# Patient Record
Sex: FEMALE | Race: WHITE | NOT HISPANIC OR LATINO | Employment: UNEMPLOYED | URBAN - METROPOLITAN AREA
[De-identification: names, ages, dates, MRNs, and addresses within clinical notes are randomized per-mention and may not be internally consistent; named-entity substitution may affect disease eponyms.]

---

## 2023-04-21 ENCOUNTER — LAB (OUTPATIENT)
Dept: LAB | Facility: HOSPITAL | Age: 33
End: 2023-04-21
Payer: MEDICAID

## 2023-04-21 ENCOUNTER — TRANSCRIBE ORDERS (OUTPATIENT)
Dept: LAB | Facility: HOSPITAL | Age: 33
End: 2023-04-21
Payer: MEDICAID

## 2023-04-21 DIAGNOSIS — Z32.01 PREGNANCY EXAMINATION OR TEST, POSITIVE RESULT: ICD-10-CM

## 2023-04-21 DIAGNOSIS — Z32.01 PREGNANCY EXAMINATION OR TEST, POSITIVE RESULT: Primary | ICD-10-CM

## 2023-04-21 LAB
ABO GROUP BLD: NORMAL
BLD GP AB SCN SERPL QL: NEGATIVE
DEPRECATED RDW RBC AUTO: 45.2 FL (ref 37–54)
ERYTHROCYTE [DISTWIDTH] IN BLOOD BY AUTOMATED COUNT: 13.4 % (ref 12.3–15.4)
HBV SURFACE AG SERPL QL IA: NORMAL
HCT VFR BLD AUTO: 40.2 % (ref 34–46.6)
HGB BLD-MCNC: 13.5 G/DL (ref 12–15.9)
MCH RBC QN AUTO: 30.5 PG (ref 26.6–33)
MCHC RBC AUTO-ENTMCNC: 33.6 G/DL (ref 31.5–35.7)
MCV RBC AUTO: 91 FL (ref 79–97)
PLATELET # BLD AUTO: 298 10*3/MM3 (ref 140–450)
PMV BLD AUTO: 12.6 FL (ref 6–12)
RBC # BLD AUTO: 4.42 10*6/MM3 (ref 3.77–5.28)
RH BLD: POSITIVE
WBC NRBC COR # BLD: 10.87 10*3/MM3 (ref 3.4–10.8)

## 2023-04-21 PROCEDURE — 86762 RUBELLA ANTIBODY: CPT

## 2023-04-21 PROCEDURE — 86803 HEPATITIS C AB TEST: CPT

## 2023-04-21 PROCEDURE — 36415 COLL VENOUS BLD VENIPUNCTURE: CPT

## 2023-04-21 PROCEDURE — 82947 ASSAY GLUCOSE BLOOD QUANT: CPT

## 2023-04-21 PROCEDURE — 86850 RBC ANTIBODY SCREEN: CPT

## 2023-04-21 PROCEDURE — 86900 BLOOD TYPING SEROLOGIC ABO: CPT

## 2023-04-21 PROCEDURE — 86901 BLOOD TYPING SEROLOGIC RH(D): CPT

## 2023-04-21 PROCEDURE — G0432 EIA HIV-1/HIV-2 SCREEN: HCPCS

## 2023-04-21 PROCEDURE — 87340 HEPATITIS B SURFACE AG IA: CPT

## 2023-04-21 PROCEDURE — 85027 COMPLETE CBC AUTOMATED: CPT

## 2023-04-21 PROCEDURE — 86780 TREPONEMA PALLIDUM: CPT

## 2023-04-22 LAB
GLUCOSE SERPL-MCNC: 103 MG/DL (ref 65–99)
HCV AB SER DONR QL: NORMAL
HIV1+2 AB SER QL: NORMAL

## 2023-04-23 LAB — RUBV IGG SERPL IA-ACNC: 0.97 INDEX

## 2023-04-25 LAB — TREPONEMA PALLIDUM IGG+IGM AB [PRESENCE] IN SERUM OR PLASMA BY IMMUNOASSAY: NON REACTIVE

## 2023-08-10 ENCOUNTER — LAB (OUTPATIENT)
Dept: LAB | Facility: HOSPITAL | Age: 33
End: 2023-08-10

## 2023-08-10 ENCOUNTER — TRANSCRIBE ORDERS (OUTPATIENT)
Dept: LAB | Facility: HOSPITAL | Age: 33
End: 2023-08-10

## 2023-08-10 DIAGNOSIS — Z3A.19 19 WEEKS GESTATION OF PREGNANCY: ICD-10-CM

## 2023-08-10 DIAGNOSIS — Z34.82 PRENATAL CARE, SUBSEQUENT PREGNANCY, SECOND TRIMESTER: Primary | ICD-10-CM

## 2023-08-10 DIAGNOSIS — Z34.82 PRENATAL CARE, SUBSEQUENT PREGNANCY, SECOND TRIMESTER: ICD-10-CM

## 2023-08-10 DIAGNOSIS — O24.419 GESTATIONAL DIABETES MELLITUS (GDM), ANTEPARTUM, GESTATIONAL DIABETES METHOD OF CONTROL UNSPECIFIED: ICD-10-CM

## 2023-08-10 LAB
DEPRECATED RDW RBC AUTO: 41.8 FL (ref 37–54)
ERYTHROCYTE [DISTWIDTH] IN BLOOD BY AUTOMATED COUNT: 12.7 % (ref 12.3–15.4)
GLUCOSE 1H P 100 G GLC PO SERPL-MCNC: 121 MG/DL (ref 65–139)
HCT VFR BLD AUTO: 33.5 % (ref 34–46.6)
HGB BLD-MCNC: 11.3 G/DL (ref 12–15.9)
MCH RBC QN AUTO: 30.4 PG (ref 26.6–33)
MCHC RBC AUTO-ENTMCNC: 33.7 G/DL (ref 31.5–35.7)
MCV RBC AUTO: 90.1 FL (ref 79–97)
PLATELET # BLD AUTO: 286 10*3/MM3 (ref 140–450)
PMV BLD AUTO: 11.4 FL (ref 6–12)
RBC # BLD AUTO: 3.72 10*6/MM3 (ref 3.77–5.28)
WBC NRBC COR # BLD: 12.3 10*3/MM3 (ref 3.4–10.8)

## 2023-08-10 PROCEDURE — 82950 GLUCOSE TEST: CPT

## 2023-08-10 PROCEDURE — 36415 COLL VENOUS BLD VENIPUNCTURE: CPT

## 2023-08-10 PROCEDURE — 82962 GLUCOSE BLOOD TEST: CPT | Performed by: OBSTETRICS & GYNECOLOGY

## 2023-08-10 PROCEDURE — 85027 COMPLETE CBC AUTOMATED: CPT

## 2023-10-18 ENCOUNTER — PREP FOR SURGERY (OUTPATIENT)
Dept: OTHER | Facility: HOSPITAL | Age: 33
End: 2023-10-18

## 2023-10-18 DIAGNOSIS — Z98.891 PREVIOUS CESAREAN SECTION: Primary | ICD-10-CM

## 2023-10-18 RX ORDER — LIDOCAINE HYDROCHLORIDE 10 MG/ML
0.5 INJECTION, SOLUTION EPIDURAL; INFILTRATION; INTRACAUDAL; PERINEURAL ONCE AS NEEDED
OUTPATIENT
Start: 2023-10-18

## 2023-10-18 RX ORDER — SODIUM CHLORIDE 0.9 % (FLUSH) 0.9 %
10 SYRINGE (ML) INJECTION EVERY 12 HOURS SCHEDULED
OUTPATIENT
Start: 2023-10-18

## 2023-10-18 RX ORDER — ONDANSETRON 2 MG/ML
4 INJECTION INTRAMUSCULAR; INTRAVENOUS EVERY 6 HOURS PRN
OUTPATIENT
Start: 2023-10-18

## 2023-10-18 RX ORDER — ONDANSETRON 4 MG/1
4 TABLET, FILM COATED ORAL EVERY 6 HOURS PRN
OUTPATIENT
Start: 2023-10-18

## 2023-10-18 RX ORDER — SODIUM CHLORIDE 0.9 % (FLUSH) 0.9 %
10 SYRINGE (ML) INJECTION AS NEEDED
OUTPATIENT
Start: 2023-10-18

## 2023-10-18 RX ORDER — METHYLERGONOVINE MALEATE 0.2 MG/ML
200 INJECTION INTRAVENOUS ONCE AS NEEDED
OUTPATIENT
Start: 2023-10-18

## 2023-10-18 RX ORDER — CEFAZOLIN SODIUM IN 0.9 % NACL 3 G/100 ML
3 INTRAVENOUS SOLUTION, PIGGYBACK (ML) INTRAVENOUS ONCE
OUTPATIENT
Start: 2023-10-18 | End: 2023-10-18

## 2023-10-18 RX ORDER — CARBOPROST TROMETHAMINE 250 UG/ML
250 INJECTION, SOLUTION INTRAMUSCULAR AS NEEDED
OUTPATIENT
Start: 2023-10-18

## 2023-10-18 RX ORDER — OXYTOCIN/0.9 % SODIUM CHLORIDE 30/500 ML
85 PLASTIC BAG, INJECTION (ML) INTRAVENOUS ONCE
OUTPATIENT
Start: 2023-10-18 | End: 2023-10-18

## 2023-10-18 RX ORDER — SODIUM CHLORIDE 9 MG/ML
40 INJECTION, SOLUTION INTRAVENOUS AS NEEDED
OUTPATIENT
Start: 2023-10-18

## 2023-10-18 RX ORDER — ACETAMINOPHEN 500 MG
1000 TABLET ORAL ONCE
OUTPATIENT
Start: 2023-10-18 | End: 2023-10-18

## 2023-10-18 RX ORDER — OXYTOCIN/0.9 % SODIUM CHLORIDE 30/500 ML
650 PLASTIC BAG, INJECTION (ML) INTRAVENOUS ONCE
OUTPATIENT
Start: 2023-10-18 | End: 2023-10-18

## 2023-10-18 RX ORDER — KETOROLAC TROMETHAMINE 30 MG/ML
30 INJECTION, SOLUTION INTRAMUSCULAR; INTRAVENOUS ONCE
OUTPATIENT
Start: 2023-10-18 | End: 2023-10-18

## 2023-10-18 RX ORDER — MISOPROSTOL 200 UG/1
800 TABLET ORAL AS NEEDED
OUTPATIENT
Start: 2023-10-18

## 2023-10-18 RX ORDER — OXYCODONE HYDROCHLORIDE AND ACETAMINOPHEN 5; 325 MG/1; MG/1
1 TABLET ORAL EVERY 4 HOURS PRN
OUTPATIENT
Start: 2023-10-18 | End: 2023-10-28

## 2023-10-18 RX ORDER — CITRIC ACID/SODIUM CITRATE 334-500MG
30 SOLUTION, ORAL ORAL ONCE
OUTPATIENT
Start: 2023-10-18 | End: 2023-10-18

## 2023-10-18 RX ORDER — SODIUM CHLORIDE, SODIUM LACTATE, POTASSIUM CHLORIDE, CALCIUM CHLORIDE 600; 310; 30; 20 MG/100ML; MG/100ML; MG/100ML; MG/100ML
125 INJECTION, SOLUTION INTRAVENOUS CONTINUOUS
OUTPATIENT
Start: 2023-10-18

## 2023-10-24 ENCOUNTER — PRE-ADMISSION TESTING (OUTPATIENT)
Dept: PREADMISSION TESTING | Facility: HOSPITAL | Age: 33
End: 2023-10-24

## 2023-10-24 VITALS — HEIGHT: 63 IN | BODY MASS INDEX: 47.79 KG/M2 | WEIGHT: 269.73 LBS

## 2023-10-24 DIAGNOSIS — Z98.891 PREVIOUS CESAREAN SECTION: ICD-10-CM

## 2023-10-24 LAB
ABO GROUP BLD: NORMAL
BLD GP AB SCN SERPL QL: NEGATIVE
DEPRECATED RDW RBC AUTO: 43.8 FL (ref 37–54)
ERYTHROCYTE [DISTWIDTH] IN BLOOD BY AUTOMATED COUNT: 13.2 % (ref 12.3–15.4)
HCT VFR BLD AUTO: 34.1 % (ref 34–46.6)
HGB BLD-MCNC: 11 G/DL (ref 12–15.9)
MCH RBC QN AUTO: 29.2 PG (ref 26.6–33)
MCHC RBC AUTO-ENTMCNC: 32.3 G/DL (ref 31.5–35.7)
MCV RBC AUTO: 90.5 FL (ref 79–97)
PLATELET # BLD AUTO: 228 10*3/MM3 (ref 140–450)
PMV BLD AUTO: 11.4 FL (ref 6–12)
RBC # BLD AUTO: 3.77 10*6/MM3 (ref 3.77–5.28)
RH BLD: POSITIVE
T&S EXPIRATION DATE: NORMAL
WBC NRBC COR # BLD: 8.24 10*3/MM3 (ref 3.4–10.8)

## 2023-10-24 PROCEDURE — 86900 BLOOD TYPING SEROLOGIC ABO: CPT

## 2023-10-24 PROCEDURE — 85027 COMPLETE CBC AUTOMATED: CPT

## 2023-10-24 PROCEDURE — 86901 BLOOD TYPING SEROLOGIC RH(D): CPT

## 2023-10-24 PROCEDURE — 86850 RBC ANTIBODY SCREEN: CPT

## 2023-10-24 PROCEDURE — 36415 COLL VENOUS BLD VENIPUNCTURE: CPT

## 2023-10-24 RX ORDER — PRENATAL WITH FERROUS FUM AND FOLIC ACID 3080; 920; 120; 400; 22; 1.84; 3; 20; 10; 1; 12; 200; 27; 25; 2 [IU]/1; [IU]/1; MG/1; [IU]/1; MG/1; MG/1; MG/1; MG/1; MG/1; MG/1; UG/1; MG/1; MG/1; MG/1; MG/1
TABLET ORAL DAILY
COMMUNITY

## 2023-10-24 NOTE — PAT
Blood bank bracelet applied to patient during Pre Admission Testing visit.  Patient instructed not to remove from arm until after procedure and they are discharged from the hospital.  Explained to patient that they may shower and get the bracelet wet, but not to immerse under water for longer periods (bathing, swimming, hand dishwashing, etc).  Patient verbalized understanding.    Patient instructed to drink 20 ounces of Gatorade or Gatorlyte (if diabetic) and it needs to be completed 1 hour (for Main OR patients) or 2 hours ([facility]) before given arrival time for procedure (NO RED Gatorade and NO Gatorade Zero).    Patient verbalized understanding.  Instructed patient to take two Tylenol extra strength (total of 1000 mg) the night before surgery.    Patient viewed general PAT education video as instructed in their preoperative information received from their surgeon.  Patient stated the general PAT education video was viewed in its entirety and survey completed.  Copies of PAT general education handouts (Incentive Spirometry, Meds to Beds Program, Patient Belongings, Pre-op skin preparation instructions, Blood Glucose testing, Visitor policy, Surgery FAQ, Code H) distributed to patient if not printed. Education related to the PAT pass and skin preparation for surgery (if applicable) completed in PAT as a reinforcement to PAT education video. Patient instructed to return PAT pass provided today as well as completed skin preparation sheet (if applicable) on the day of procedure.     Additionally if patient had not viewed video yet but intended to view it at home or in our waiting area, then referred them to the handout with QR code/link provided during PAT visit.  Instructed patient to complete survey after viewing the video in its entirety.  Encouraged patient/family to read PAT general education handouts thoroughly and notify PAT staff with any questions or concerns. Patient  verbalized understanding of all information and priority content.    Patient to apply Chlorhexadine wipes  to surgical area (as instructed) the night before procedure and the AM of procedure. Wipes provided.

## 2023-10-24 NOTE — DISCHARGE INSTRUCTIONS
What to know before your arrive:    -Do not eat, drink or chew gum beginning 8 hours before your scheduled arrival time to the hospital.  Except please drink 20 ounces of Gatorade and complete two hours before your given arrival time to the hospital.  If you drink too close to surgery time, your sugery may  be delayed or cancelled.  Please complete as instructed.     -Do not shave any part of your body including abdomen or pelvic are for two days before your procedure.  -If you are taking a scheduled medication (insulin, blood pressure medicine,antibiotics) please consult with your physician whether to take on the day of surgery.  -Remove all jewelry including rings, wedding bands, and piercing before coming to the hospital.  -Leave important valuables at home.  -Do not wear dark fingernail polish.  -Please take two Tylenol 500 mg tablets the evening before surgery.  -Bring the following with you to the hospital:    -Picture ID and insurance, Medicare or Medicaid cards    -Co-pay/deductible required by insurance (Cash, Check, Credit Card)    -Copy of living will or power  document (if applicable)    -CPAP mask and tubing, not machine (if applicable)    -Skin prep instructions sheet    What to know the day of procedure:    -Park in the [facility], take elevator for first floor, exit to the right and  proceed through the doors to outside, follow the covered sidewalk to the entrance of the [place], [facility] the [facility] and [facility], enter the [place] to your right BEFORE entering the 17[address] lobby.  Take the elevators to the 3rd floor ([address] [place]).  -Leave unnecessary items in your vehicle, including your suitcase.  Your support  person or a family member can get it for you after your procedure.   -Check in at the reception desk in the lobby of the 3rd floor ([address] [place]).   -One person may accompany you to the pre-op/recovery area.  Please have  other family members wait in the  waiting room.   -An anesthesiologist will meet with your prior to your procedure.   -After anesthesia has been initiated, one person may accompany you in the  operating room.   -No video cameras are permitted in the operating room; only still cameras,  Please.      What to expect while you are in recovery:     -One person may stay with you while you are in recovery.   -If the baby is stable, he/she may visit to initiate breastfeeding & [facility].      CHLORHEXIDINE GLUCONATE WIPES AND INSTRUCTIONS GIVEN TO PATIENT

## 2023-10-27 ENCOUNTER — ANESTHESIA (OUTPATIENT)
Dept: LABOR AND DELIVERY | Facility: HOSPITAL | Age: 33
End: 2023-10-27

## 2023-10-27 ENCOUNTER — ANESTHESIA EVENT (OUTPATIENT)
Dept: LABOR AND DELIVERY | Facility: HOSPITAL | Age: 33
End: 2023-10-27

## 2023-10-27 ENCOUNTER — HOSPITAL ENCOUNTER (INPATIENT)
Facility: HOSPITAL | Age: 33
LOS: 2 days | Discharge: HOME | End: 2023-10-29
Attending: OBSTETRICS & GYNECOLOGY | Admitting: OBSTETRICS & GYNECOLOGY

## 2023-10-27 DIAGNOSIS — Z98.891 S/P REPEAT LOW TRANSVERSE C-SECTION: Primary | ICD-10-CM

## 2023-10-27 DIAGNOSIS — Z98.891 PREVIOUS CESAREAN SECTION: ICD-10-CM

## 2023-10-27 LAB
ABO GROUP BLD: NORMAL
AMPHET+METHAMPHET UR QL: NEGATIVE
AMPHETAMINES UR QL: NEGATIVE
BARBITURATES UR QL SCN: NEGATIVE
BENZODIAZ UR QL SCN: NEGATIVE
BLD GP AB SCN SERPL QL: NEGATIVE
BUPRENORPHINE SERPL-MCNC: NEGATIVE NG/ML
CANNABINOIDS SERPL QL: NEGATIVE
COCAINE UR QL: NEGATIVE
FENTANYL UR-MCNC: NEGATIVE NG/ML
METHADONE UR QL SCN: NEGATIVE
OPIATES UR QL: NEGATIVE
OXYCODONE UR QL SCN: NEGATIVE
PCP UR QL SCN: NEGATIVE
PROPOXYPH UR QL: NEGATIVE
RH BLD: POSITIVE
T&S EXPIRATION DATE: NORMAL
TRICYCLICS UR QL SCN: NEGATIVE

## 2023-10-27 PROCEDURE — 25810000003 LACTATED RINGERS PER 1000 ML: Performed by: OBSTETRICS & GYNECOLOGY

## 2023-10-27 PROCEDURE — 80307 DRUG TEST PRSMV CHEM ANLYZR: CPT | Performed by: OBSTETRICS & GYNECOLOGY

## 2023-10-27 PROCEDURE — 86900 BLOOD TYPING SEROLOGIC ABO: CPT | Performed by: OBSTETRICS & GYNECOLOGY

## 2023-10-27 PROCEDURE — 25010000002 ONDANSETRON PER 1 MG: Performed by: NURSE ANESTHETIST, CERTIFIED REGISTERED

## 2023-10-27 PROCEDURE — 25010000002 BUPIVACAINE IN DEXTROSE 0.75-8.25 % SOLUTION: Performed by: NURSE ANESTHETIST, CERTIFIED REGISTERED

## 2023-10-27 PROCEDURE — 25010000002 MIDAZOLAM PER 1 MG: Performed by: NURSE ANESTHETIST, CERTIFIED REGISTERED

## 2023-10-27 PROCEDURE — 25010000002 FENTANYL CITRATE (PF) 50 MCG/ML SOLUTION: Performed by: NURSE ANESTHETIST, CERTIFIED REGISTERED

## 2023-10-27 PROCEDURE — 25810000003 LACTATED RINGERS SOLUTION: Performed by: OBSTETRICS & GYNECOLOGY

## 2023-10-27 PROCEDURE — 25010000002 MORPHINE PER 10 MG: Performed by: NURSE ANESTHETIST, CERTIFIED REGISTERED

## 2023-10-27 PROCEDURE — 86850 RBC ANTIBODY SCREEN: CPT | Performed by: OBSTETRICS & GYNECOLOGY

## 2023-10-27 PROCEDURE — 86901 BLOOD TYPING SEROLOGIC RH(D): CPT | Performed by: OBSTETRICS & GYNECOLOGY

## 2023-10-27 PROCEDURE — 25010000002 KETOROLAC TROMETHAMINE PER 15 MG: Performed by: OBSTETRICS & GYNECOLOGY

## 2023-10-27 PROCEDURE — 59025 FETAL NON-STRESS TEST: CPT

## 2023-10-27 PROCEDURE — 25010000002 CEFAZOLIN PER 500 MG: Performed by: OBSTETRICS & GYNECOLOGY

## 2023-10-27 RX ORDER — OXYTOCIN/0.9 % SODIUM CHLORIDE 30/500 ML
650 PLASTIC BAG, INJECTION (ML) INTRAVENOUS ONCE
Status: DISCONTINUED | OUTPATIENT
Start: 2023-10-27 | End: 2023-10-27 | Stop reason: HOSPADM

## 2023-10-27 RX ORDER — ONDANSETRON 2 MG/ML
4 INJECTION INTRAMUSCULAR; INTRAVENOUS EVERY 6 HOURS PRN
Status: DISCONTINUED | OUTPATIENT
Start: 2023-10-27 | End: 2023-10-27 | Stop reason: HOSPADM

## 2023-10-27 RX ORDER — FAMOTIDINE 10 MG/ML
INJECTION, SOLUTION INTRAVENOUS AS NEEDED
Status: DISCONTINUED | OUTPATIENT
Start: 2023-10-27 | End: 2023-10-27 | Stop reason: SURG

## 2023-10-27 RX ORDER — GLYCOPYRROLATE 0.2 MG/ML
INJECTION INTRAMUSCULAR; INTRAVENOUS AS NEEDED
Status: DISCONTINUED | OUTPATIENT
Start: 2023-10-27 | End: 2023-10-27 | Stop reason: SURG

## 2023-10-27 RX ORDER — CEFAZOLIN SODIUM IN 0.9 % NACL 3 G/100 ML
3 INTRAVENOUS SOLUTION, PIGGYBACK (ML) INTRAVENOUS ONCE
Status: COMPLETED | OUTPATIENT
Start: 2023-10-27 | End: 2023-10-27

## 2023-10-27 RX ORDER — POLYETHYLENE GLYCOL 3350 17 G/17G
17 POWDER, FOR SOLUTION ORAL DAILY
Status: DISCONTINUED | OUTPATIENT
Start: 2023-10-27 | End: 2023-10-29 | Stop reason: HOSPADM

## 2023-10-27 RX ORDER — ACETAMINOPHEN 500 MG
1000 TABLET ORAL ONCE
Status: COMPLETED | OUTPATIENT
Start: 2023-10-27 | End: 2023-10-27

## 2023-10-27 RX ORDER — LIDOCAINE HYDROCHLORIDE 10 MG/ML
0.5 INJECTION, SOLUTION EPIDURAL; INFILTRATION; INTRACAUDAL; PERINEURAL ONCE AS NEEDED
Status: DISCONTINUED | OUTPATIENT
Start: 2023-10-27 | End: 2023-10-27 | Stop reason: HOSPADM

## 2023-10-27 RX ORDER — FENTANYL CITRATE 50 UG/ML
INJECTION, SOLUTION INTRAMUSCULAR; INTRAVENOUS AS NEEDED
Status: DISCONTINUED | OUTPATIENT
Start: 2023-10-27 | End: 2023-10-27 | Stop reason: SURG

## 2023-10-27 RX ORDER — OXYCODONE HYDROCHLORIDE 10 MG/1
10 TABLET ORAL EVERY 4 HOURS PRN
Status: DISCONTINUED | OUTPATIENT
Start: 2023-10-27 | End: 2023-10-29 | Stop reason: HOSPADM

## 2023-10-27 RX ORDER — CARBOPROST TROMETHAMINE 250 UG/ML
250 INJECTION, SOLUTION INTRAMUSCULAR AS NEEDED
Status: DISCONTINUED | OUTPATIENT
Start: 2023-10-27 | End: 2023-10-27 | Stop reason: HOSPADM

## 2023-10-27 RX ORDER — MORPHINE SULFATE 0.5 MG/ML
INJECTION, SOLUTION EPIDURAL; INTRATHECAL; INTRAVENOUS AS NEEDED
Status: DISCONTINUED | OUTPATIENT
Start: 2023-10-27 | End: 2023-10-27 | Stop reason: SURG

## 2023-10-27 RX ORDER — MISOPROSTOL 200 UG/1
600 TABLET ORAL AS NEEDED
Status: DISCONTINUED | OUTPATIENT
Start: 2023-10-27 | End: 2023-10-29 | Stop reason: HOSPADM

## 2023-10-27 RX ORDER — NALOXONE HCL 0.4 MG/ML
0.4 VIAL (ML) INJECTION
Status: ACTIVE | OUTPATIENT
Start: 2023-10-27 | End: 2023-10-28

## 2023-10-27 RX ORDER — DIPHENHYDRAMINE HCL 25 MG
25 CAPSULE ORAL EVERY 4 HOURS PRN
Status: DISCONTINUED | OUTPATIENT
Start: 2023-10-27 | End: 2023-10-29 | Stop reason: HOSPADM

## 2023-10-27 RX ORDER — EPHEDRINE SULFATE 5 MG/ML
INJECTION INTRAVENOUS AS NEEDED
Status: DISCONTINUED | OUTPATIENT
Start: 2023-10-27 | End: 2023-10-27 | Stop reason: SURG

## 2023-10-27 RX ORDER — OXYTOCIN/0.9 % SODIUM CHLORIDE 30/500 ML
PLASTIC BAG, INJECTION (ML) INTRAVENOUS AS NEEDED
Status: DISCONTINUED | OUTPATIENT
Start: 2023-10-27 | End: 2023-10-27 | Stop reason: SURG

## 2023-10-27 RX ORDER — OXYTOCIN/0.9 % SODIUM CHLORIDE 30/500 ML
125 PLASTIC BAG, INJECTION (ML) INTRAVENOUS CONTINUOUS PRN
Status: DISCONTINUED | OUTPATIENT
Start: 2023-10-27 | End: 2023-10-29 | Stop reason: HOSPADM

## 2023-10-27 RX ORDER — MISOPROSTOL 200 UG/1
800 TABLET ORAL AS NEEDED
Status: DISCONTINUED | OUTPATIENT
Start: 2023-10-27 | End: 2023-10-27 | Stop reason: HOSPADM

## 2023-10-27 RX ORDER — ACETAMINOPHEN 500 MG
1000 TABLET ORAL EVERY 6 HOURS
Status: COMPLETED | OUTPATIENT
Start: 2023-10-27 | End: 2023-10-28

## 2023-10-27 RX ORDER — FENTANYL CITRATE 50 UG/ML
50 INJECTION, SOLUTION INTRAMUSCULAR; INTRAVENOUS
Status: DISCONTINUED | OUTPATIENT
Start: 2023-10-27 | End: 2023-10-27 | Stop reason: HOSPADM

## 2023-10-27 RX ORDER — KETOROLAC TROMETHAMINE 30 MG/ML
30 INJECTION, SOLUTION INTRAMUSCULAR; INTRAVENOUS ONCE
Status: COMPLETED | OUTPATIENT
Start: 2023-10-27 | End: 2023-10-27

## 2023-10-27 RX ORDER — ONDANSETRON 2 MG/ML
4 INJECTION INTRAMUSCULAR; INTRAVENOUS ONCE AS NEEDED
Status: DISCONTINUED | OUTPATIENT
Start: 2023-10-27 | End: 2023-10-27 | Stop reason: HOSPADM

## 2023-10-27 RX ORDER — SODIUM CHLORIDE 9 MG/ML
40 INJECTION, SOLUTION INTRAVENOUS AS NEEDED
Status: DISCONTINUED | OUTPATIENT
Start: 2023-10-27 | End: 2023-10-27 | Stop reason: HOSPADM

## 2023-10-27 RX ORDER — DOCUSATE SODIUM 100 MG/1
100 CAPSULE, LIQUID FILLED ORAL 2 TIMES DAILY PRN
Status: DISCONTINUED | OUTPATIENT
Start: 2023-10-27 | End: 2023-10-29 | Stop reason: HOSPADM

## 2023-10-27 RX ORDER — DIPHENHYDRAMINE HYDROCHLORIDE 50 MG/ML
25 INJECTION INTRAMUSCULAR; INTRAVENOUS EVERY 4 HOURS PRN
Status: DISCONTINUED | OUTPATIENT
Start: 2023-10-27 | End: 2023-10-29 | Stop reason: HOSPADM

## 2023-10-27 RX ORDER — KETOROLAC TROMETHAMINE 30 MG/ML
30 INJECTION, SOLUTION INTRAMUSCULAR; INTRAVENOUS ONCE
Status: DISCONTINUED | OUTPATIENT
Start: 2023-10-27 | End: 2023-10-27 | Stop reason: SDUPTHER

## 2023-10-27 RX ORDER — SODIUM CHLORIDE, SODIUM LACTATE, POTASSIUM CHLORIDE, CALCIUM CHLORIDE 600; 310; 30; 20 MG/100ML; MG/100ML; MG/100ML; MG/100ML
125 INJECTION, SOLUTION INTRAVENOUS CONTINUOUS
Status: DISCONTINUED | OUTPATIENT
Start: 2023-10-27 | End: 2023-10-28

## 2023-10-27 RX ORDER — ONDANSETRON 4 MG/1
4 TABLET, FILM COATED ORAL EVERY 6 HOURS PRN
Status: DISCONTINUED | OUTPATIENT
Start: 2023-10-27 | End: 2023-10-29 | Stop reason: HOSPADM

## 2023-10-27 RX ORDER — ONDANSETRON 2 MG/ML
4 INJECTION INTRAMUSCULAR; INTRAVENOUS EVERY 6 HOURS PRN
Status: DISCONTINUED | OUTPATIENT
Start: 2023-10-27 | End: 2023-10-29 | Stop reason: HOSPADM

## 2023-10-27 RX ORDER — METHYLERGONOVINE MALEATE 0.2 MG/ML
200 INJECTION INTRAVENOUS AS NEEDED
Status: DISCONTINUED | OUTPATIENT
Start: 2023-10-27 | End: 2023-10-29 | Stop reason: HOSPADM

## 2023-10-27 RX ORDER — ACETAMINOPHEN 325 MG/1
650 TABLET ORAL EVERY 6 HOURS
Status: DISCONTINUED | OUTPATIENT
Start: 2023-10-28 | End: 2023-10-29 | Stop reason: HOSPADM

## 2023-10-27 RX ORDER — OXYTOCIN/0.9 % SODIUM CHLORIDE 30/500 ML
85 PLASTIC BAG, INJECTION (ML) INTRAVENOUS ONCE
Status: DISCONTINUED | OUTPATIENT
Start: 2023-10-27 | End: 2023-10-27 | Stop reason: HOSPADM

## 2023-10-27 RX ORDER — ALUMINA, MAGNESIA, AND SIMETHICONE 2400; 2400; 240 MG/30ML; MG/30ML; MG/30ML
15 SUSPENSION ORAL EVERY 4 HOURS PRN
Status: DISCONTINUED | OUTPATIENT
Start: 2023-10-27 | End: 2023-10-29 | Stop reason: HOSPADM

## 2023-10-27 RX ORDER — SODIUM CHLORIDE 0.9 % (FLUSH) 0.9 %
10 SYRINGE (ML) INJECTION AS NEEDED
Status: DISCONTINUED | OUTPATIENT
Start: 2023-10-27 | End: 2023-10-27 | Stop reason: HOSPADM

## 2023-10-27 RX ORDER — KETOROLAC TROMETHAMINE 15 MG/ML
15 INJECTION, SOLUTION INTRAMUSCULAR; INTRAVENOUS EVERY 6 HOURS
Status: COMPLETED | OUTPATIENT
Start: 2023-10-27 | End: 2023-10-28

## 2023-10-27 RX ORDER — IBUPROFEN 600 MG/1
600 TABLET ORAL EVERY 6 HOURS
Status: DISCONTINUED | OUTPATIENT
Start: 2023-10-28 | End: 2023-10-29 | Stop reason: HOSPADM

## 2023-10-27 RX ORDER — METHYLERGONOVINE MALEATE 0.2 MG/ML
200 INJECTION INTRAVENOUS ONCE AS NEEDED
Status: DISCONTINUED | OUTPATIENT
Start: 2023-10-27 | End: 2023-10-27 | Stop reason: HOSPADM

## 2023-10-27 RX ORDER — ENOXAPARIN SODIUM 100 MG/ML
40 INJECTION SUBCUTANEOUS EVERY 12 HOURS
Status: DISCONTINUED | OUTPATIENT
Start: 2023-10-28 | End: 2023-10-29 | Stop reason: HOSPADM

## 2023-10-27 RX ORDER — SODIUM CHLORIDE 0.9 % (FLUSH) 0.9 %
10 SYRINGE (ML) INJECTION EVERY 12 HOURS SCHEDULED
Status: DISCONTINUED | OUTPATIENT
Start: 2023-10-27 | End: 2023-10-27 | Stop reason: HOSPADM

## 2023-10-27 RX ORDER — PRENATAL VIT/IRON FUM/FOLIC AC 27MG-0.8MG
1 TABLET ORAL DAILY
Status: DISCONTINUED | OUTPATIENT
Start: 2023-10-27 | End: 2023-10-29 | Stop reason: HOSPADM

## 2023-10-27 RX ORDER — SIMETHICONE 80 MG
80 TABLET,CHEWABLE ORAL 4 TIMES DAILY PRN
Status: DISCONTINUED | OUTPATIENT
Start: 2023-10-27 | End: 2023-10-29 | Stop reason: HOSPADM

## 2023-10-27 RX ORDER — CARBOPROST TROMETHAMINE 250 UG/ML
250 INJECTION, SOLUTION INTRAMUSCULAR AS NEEDED
Status: DISCONTINUED | OUTPATIENT
Start: 2023-10-27 | End: 2023-10-29 | Stop reason: HOSPADM

## 2023-10-27 RX ORDER — OXYCODONE HYDROCHLORIDE AND ACETAMINOPHEN 5; 325 MG/1; MG/1
1 TABLET ORAL EVERY 4 HOURS PRN
Status: DISCONTINUED | OUTPATIENT
Start: 2023-10-27 | End: 2023-10-27 | Stop reason: HOSPADM

## 2023-10-27 RX ORDER — MIDAZOLAM HYDROCHLORIDE 1 MG/ML
INJECTION INTRAMUSCULAR; INTRAVENOUS AS NEEDED
Status: DISCONTINUED | OUTPATIENT
Start: 2023-10-27 | End: 2023-10-27 | Stop reason: SURG

## 2023-10-27 RX ORDER — ONDANSETRON 4 MG/1
4 TABLET, FILM COATED ORAL EVERY 6 HOURS PRN
Status: DISCONTINUED | OUTPATIENT
Start: 2023-10-27 | End: 2023-10-27 | Stop reason: HOSPADM

## 2023-10-27 RX ORDER — ONDANSETRON 2 MG/ML
INJECTION INTRAMUSCULAR; INTRAVENOUS AS NEEDED
Status: DISCONTINUED | OUTPATIENT
Start: 2023-10-27 | End: 2023-10-27 | Stop reason: SURG

## 2023-10-27 RX ORDER — CALCIUM CARBONATE 500 MG/1
1 TABLET, CHEWABLE ORAL EVERY 4 HOURS PRN
Status: DISCONTINUED | OUTPATIENT
Start: 2023-10-27 | End: 2023-10-29 | Stop reason: HOSPADM

## 2023-10-27 RX ORDER — BUPIVACAINE HYDROCHLORIDE 7.5 MG/ML
INJECTION, SOLUTION INTRASPINAL AS NEEDED
Status: DISCONTINUED | OUTPATIENT
Start: 2023-10-27 | End: 2023-10-27 | Stop reason: SURG

## 2023-10-27 RX ORDER — HYDROCORTISONE 25 MG/G
1 CREAM TOPICAL AS NEEDED
Status: DISCONTINUED | OUTPATIENT
Start: 2023-10-27 | End: 2023-10-29 | Stop reason: HOSPADM

## 2023-10-27 RX ORDER — OXYCODONE HYDROCHLORIDE 5 MG/1
5 TABLET ORAL EVERY 4 HOURS PRN
Status: DISCONTINUED | OUTPATIENT
Start: 2023-10-27 | End: 2023-10-29 | Stop reason: HOSPADM

## 2023-10-27 RX ORDER — CITRIC ACID/SODIUM CITRATE 334-500MG
30 SOLUTION, ORAL ORAL ONCE
Status: COMPLETED | OUTPATIENT
Start: 2023-10-27 | End: 2023-10-27

## 2023-10-27 RX ADMIN — MIDAZOLAM 1 MG: 1 INJECTION INTRAMUSCULAR; INTRAVENOUS at 12:48

## 2023-10-27 RX ADMIN — ACETAMINOPHEN 1000 MG: 500 TABLET ORAL at 17:20

## 2023-10-27 RX ADMIN — MORPHINE SULFATE 0.1 MG: 0.5 INJECTION, SOLUTION EPIDURAL; INTRATHECAL; INTRAVENOUS at 12:55

## 2023-10-27 RX ADMIN — SODIUM CITRATE AND CITRIC ACID MONOHYDRATE 30 ML: 500; 334 SOLUTION ORAL at 12:41

## 2023-10-27 RX ADMIN — KETOROLAC TROMETHAMINE 30 MG: 30 INJECTION INTRAMUSCULAR; INTRAVENOUS at 15:17

## 2023-10-27 RX ADMIN — SODIUM CHLORIDE, POTASSIUM CHLORIDE, SODIUM LACTATE AND CALCIUM CHLORIDE 125 ML/HR: 600; 310; 30; 20 INJECTION, SOLUTION INTRAVENOUS at 11:15

## 2023-10-27 RX ADMIN — EPHEDRINE SULFATE 10 MG: 5 INJECTION INTRAVENOUS at 13:03

## 2023-10-27 RX ADMIN — Medication 500 ML: at 13:44

## 2023-10-27 RX ADMIN — SODIUM CHLORIDE, POTASSIUM CHLORIDE, SODIUM LACTATE AND CALCIUM CHLORIDE 125 ML/HR: 600; 310; 30; 20 INJECTION, SOLUTION INTRAVENOUS at 15:58

## 2023-10-27 RX ADMIN — ONDANSETRON 4 MG: 2 INJECTION INTRAMUSCULAR; INTRAVENOUS at 13:00

## 2023-10-27 RX ADMIN — SODIUM CHLORIDE, POTASSIUM CHLORIDE, SODIUM LACTATE AND CALCIUM CHLORIDE: 600; 310; 30; 20 INJECTION, SOLUTION INTRAVENOUS at 13:11

## 2023-10-27 RX ADMIN — FENTANYL CITRATE 20 MCG: 50 INJECTION, SOLUTION INTRAMUSCULAR; INTRAVENOUS at 12:55

## 2023-10-27 RX ADMIN — CEFAZOLIN 3 G: 10 INJECTION, POWDER, FOR SOLUTION INTRAVENOUS at 12:41

## 2023-10-27 RX ADMIN — EPHEDRINE SULFATE 10 MG: 5 INJECTION INTRAVENOUS at 13:15

## 2023-10-27 RX ADMIN — FAMOTIDINE 20 MG: 10 INJECTION INTRAVENOUS at 13:00

## 2023-10-27 RX ADMIN — BUPIVACAINE HYDROCHLORIDE IN DEXTROSE 1.4 ML: 7.5 INJECTION, SOLUTION SUBARACHNOID at 12:55

## 2023-10-27 RX ADMIN — ACETAMINOPHEN 1000 MG: 500 TABLET ORAL at 11:54

## 2023-10-27 RX ADMIN — KETOROLAC TROMETHAMINE 15 MG: 15 INJECTION, SOLUTION INTRAMUSCULAR; INTRAVENOUS at 21:24

## 2023-10-27 RX ADMIN — SODIUM CHLORIDE, POTASSIUM CHLORIDE, SODIUM LACTATE AND CALCIUM CHLORIDE 1000 ML: 600; 310; 30; 20 INJECTION, SOLUTION INTRAVENOUS at 12:09

## 2023-10-27 RX ADMIN — GLYCOPYRROLATE 0.2 MG: 0.4 INJECTION INTRAMUSCULAR; INTRAVENOUS at 13:06

## 2023-10-27 NOTE — ANESTHESIA PREPROCEDURE EVALUATION
Anesthesia Evaluation     Patient summary reviewed and Nursing notes reviewed   history of anesthetic complications:  PONV  NPO Solid Status: > 8 hours  NPO Liquid Status: > 8 hours           Airway   Dental      Pulmonary - negative pulmonary ROS   Cardiovascular - negative cardio ROS        Neuro/Psych  (+) seizures (as a child-none x 12 years) well controlled, psychiatric history Anxiety and Depression  GI/Hepatic/Renal/Endo    (+) obesity    Musculoskeletal (-) negative ROS    Abdominal    Substance History - negative use     OB/GYN    (+) Pregnant    Comment:   Previous C/S  H/O GHTN -1st pregnancy      Other                    Anesthesia Plan    ASA 2     spinal and ITN       Anesthetic plan, risks, benefits, and alternatives have been provided, discussed and informed consent has been obtained with: patient.    CODE STATUS:    Level Of Support Discussed With: Patient  Code Status (Patient has no pulse and is not breathing): CPR (Attempt to Resuscitate)  Medical Interventions (Patient has pulse or is breathing): Full Support

## 2023-10-27 NOTE — L&D DELIVERY NOTE
Repeat Low Transverse  Section Procedure Note    Umu Queen    10/27/2023     Indications: 1. IUP at 39w3d   2. Previous  x 1   3. Class 3 obesity (BMI 47)    Pre-operative Diagnosis: 39w3d    Post-operative Diagnosis: same    Findings: VMI in vertex presentation; normal appearing maternal anatomy    Birth Information  YOB: 2021   Time of birth: 1:16 PM   Delivering clinician: Emma Barrera   Sex: male   Delivery type: , Low Transverse   Breech type (if applicable):     Observed anomalies/comments:         Estimated Blood Loss:  less than 800cc (QBL not calculated at time of note)           Drains: Estevez, 50cc clear urine                 Specimens: placenta (not sent to pathology)               Complications:  None; patient tolerated the procedure well.           Disposition: PACU - hemodynamically stable.           Condition: stable    Surgeon: Emma Barrera MD     Assistants: miguelangel Lizarraga    Anesthesia: Spinal anesthesia    ASA Class: 2    Procedure Details   The patient was seen in the Holding Room. The risks, benefits, complications, treatment options, and expected outcomes were discussed with the patient.  The patient concurred with the proposed plan, giving informed consent.  The site of surgery was properly noted. The patient was taken to the Operating Room, identified as Umu Queen and the procedure verified as  Delivery. A Time Out was held and the above information confirmed.    The patient was taken to the operating room where spinal anesthesia was placed and was found to be adequate. She was prepped and draped in the usual sterile fashion in the dorsal supine position with a leftward tilt. A Pfannenstiel skin incision was made with the scalpel and carried through to the underlying layer of fascia using the bovie. The fascia was then nicked in the midline, and the fascial incision was extended laterally. The superior  aspect of the fascial incision was then grasped with Kochers, elevated, and the underlying rectus muscles were dissected off bluntly and sharply. In a similar fashion, the inferior aspect of the fascial incision was grasped with the Kochers, elevated, and the underlying rectus muscles were dissected off bluntly and sharply. The rectus muscles were then  in the midline and the peritoneum was identified. The peritoneum was entered bluntly, and this incision was extended superiorly and inferiorly with good visualization of underlying structures.  The bladder blade was then inserted. A bladder flap was not turned down. The lower uterine segment was identified and a low transverse uterine incision was made using the scalpel. Delivered from vertex presentation was a  Measurements  Weight (oz): 151.39    Length (in): 20    Head circumference (in):      Chest circumference (in):    with apgars scores of         APGARS  One minute Five minutes Ten minutes Fifteen minutes Twenty minutes   Skin color: 0   0   1          Heart rate: 2   2   2          Grimace: 2   2   2           Muscle tone: 2   2   2           Breathin   2   2           Totals: 8   8   9           . The nose and mouth were suctioned, the cord was clamped and cut, and the infant was handed off to the awaiting Delivery Room Team. Cord blood was then obtained. The placenta was removed intact and appeared normal. The uterus was then exteriorized from the abdominal cavity and cleared of all clots and debris. The uterine outline, tubes and ovaries appeared normal. The hysterotomy was then closed using 1 Monocryl in a running, locked fashion. There was a rent in the lower uterine segment due to extremely thin myometrium. This was reapproximated with two figure of eight sutures of 1 monocryl. Hemostasis was observed.  The uterus was placed back inside the abdominal cavity, and the gutters were cleared of clots and debris. The hysterotomy was again  reexamined and excellent hemostasis continued to be noted. The fascia was then reapproximated with running sutures of 0 PDS. The incision was then irrigated, and the subcutaneous tissue was reapproximated with 3-0 plain. The skin was reapproximated with 4-0 Monocryl and Skin glue.    Instrument, sponge, and needle counts were correct prior the abdominal closure and at the conclusion of the case.         Emma Barrera MD  14:00 EDT  10/27/2023

## 2023-10-27 NOTE — LACTATION NOTE
"   10/27/23 1800   Maternal Information   Date of Referral 10/27/23   Person Making Referral lactation consultant  (newly postpartum)   Maternal Reason for Referral breastfeeding unsuccessful in past  (#1 pt stated \"milk dried up due to antihistamine use\")   Infant Reason for Referral  infant   Maternal Assessment   Breast Shape Bilateral:;wide;tubular   Breast Density Bilateral:;soft   Nipples Bilateral:;short  (small nipple shield provided)   Left Nipple Symptoms intact;nontender   Right Nipple Symptoms intact;nontender   Maternal Infant Feeding   Maternal Emotional State receptive;relaxed   Infant Positioning clutch/football  (right)   Signs of Milk Transfer none noted  (infant sleepy at the breast)   Pain with Feeding no   Latch Assistance full assistance needed   Support Person Involvement verbally supports mother   Milk Expression/Equipment   Breast Pump Type manual pump  (manual pump provided.  [employer] self pay information provided for a pump in the hospital)   Breast Pump Flange Type hard   Breast Pumping   Breast Pumping Interventions other (see comments)  (can pump for short or missed feeds)     Courtesy visit with breastfeeding mother that unsuccessfully breastfeed her 1st child due to milk supply.  Went over basic breastfeeding information, provided written materials.  Showed patient the patient handbook starting on page 35 regarding breastfeeding, including feeding cues.  Encouraged patient to feed infant every 3 hours or more often with feeding cues.  Provided a hand pump.  Encouraged PRN lactation as needed.  All questions/concerns answered at this time.  "

## 2023-10-27 NOTE — H&P
"[facility]  Obstetric History and Physical    Chief Complaint   Patient presents with    Scheduled        Subjective     Patient is a 33 y.o. female  currently at 39w3d, who presents for scheduled repeat  section.    Her prenatal care is complicated by class 3 obesity (BMI 47) and hx of depression (not currently on meds).  Her previous obstetric/gynecological history is noted and is remarkable for previous  x 1 with undiagnosed IUGR.    The following portions of the patients history were reviewed and updated as appropriate: current medications, allergies, past medical history, past surgical history, past family history, past social history, and problem list .       Prenatal Information:   Maternal Prenatal Labs  Blood Type No results found for: \"ABO\"   Rh Status No results found for: \"RH\"   Antibody Screen No results found for: \"ABSCRN\"   Gonnorhea No results found for: \"GCCX\"   Chlamydia No results found for: \"CLAMYDCU\"   RPR No results found for: \"RPR\"   Syphilis Antibody No results found for: \"SYPHILIS\"   Rubella No results found for: \"RUBELLAIGGIN\"   Hepatitis B Surface Antigen No results found for: \"HEPBSAG\"   HIV-1 Antibody No results found for: \"LABHIV1\"   Hepatitis C Antibody No results found for: \"HEPCAB\"   Rapid Urin Drug Screen No results found for: \"AMPMETHU\", \"BARBITSCNUR\", \"LABBENZSCN\", \"LABMETHSCN\", \"LABOPIASCN\", \"THCURSCR\", \"COCAINEUR\", \"AMPHETSCREEN\", \"PROPOXSCN\", \"BUPRENORSCNU\", \"METAMPSCNUR\", \"OXYCODONESCN\", \"TRICYCLICSCN\"   Group B Strep Culture No results found for: \"GBSANTIGEN\"           External Prenatal Results       Pregnancy Outside Results - Transcribed From Office Records - See Scanned Records For Details       Test Value Date Time    ABO  A  10/24/23 1202    Rh  Positive  10/24/23 1202    Antibody Screen  Negative  10/24/23 1202       Negative  23 1549    Varicella IgG       Rubella  0.97 index 23 1549    Hgb  11.0 g/dL 10/24/23 1202       " 11.3 g/dL 08/10/23 1620       13.5 g/dL 23 1549    Hct  34.1 % 10/24/23 1202       33.5 % 08/10/23 1620       40.2 % 23 1549    Glucose Fasting GTT       Glucose Tolerance Test 1 hour       Glucose Tolerance Test 3 hour       Gonorrhea (discrete)       Chlamydia (discrete)       RPR       VDRL       Syphilis Antibody       HBsAg  Non-Reactive  23 1549    Herpes Simplex Virus PCR       Herpes Simplex VIrus Culture       HIV  Non-Reactive  23 1549    Hep C RNA Quant PCR       Hep C Antibody  Non-Reactive  23 1549    AFP       Group B Strep       GBS Susceptibility to Clindamycin       GBS Susceptibility to Erythromycin       Fetal Fibronectin       Genetic Testing, Maternal Blood                 Drug Screening       Test Value Date Time    Urine Drug Screen       Amphetamine Screen       Barbiturate Screen       Benzodiazepine Screen       Methadone Screen       Phencyclidine Screen       Opiates Screen       THC Screen       Cocaine Screen       Propoxyphene Screen       Buprenorphine Screen       Methamphetamine Screen       Oxycodone Screen       Tricyclic Antidepressants Screen                 Legend    ^: Historical                              Past OB History:       OB History    Para Term  AB Living   2 1 1 0 0 1   SAB IAB Ectopic Molar Multiple Live Births   0 0 0 0 0 1      # Outcome Date GA Lbr Tone/2nd Weight Sex Delivery Anes PTL Lv   2 Current            1 Term 2018 39w0d  2296 g (5 lb 1 oz)  CS-LTranv   DANI       Past Medical History: Past Medical History:   Diagnosis Date    Anemia     Anesthesia complication     difficult to wake up    Anxiety     Depression     Gestational hypertension     1st pregnancy    Ovarian cyst     right    PONV (postoperative nausea and vomiting)     Seizures     as a child, hasnt had any seizures in 10 years.      Past Surgical History Past Surgical History:   Procedure Laterality Date     SECTION      HEMORRHOIDECTOMY         Family History: No family history on file.   Social History:  reports that she has never smoked. She has never been exposed to tobacco smoke. She has never used smokeless tobacco.   reports that she does not currently use alcohol.   reports no history of drug use.                   General ROS Negative Findings:Headaches, Visual Changes, Epigastric pain, Anorexia, Nausia/Vomiting, ROM, and Vaginal Bleeding    ROS      Objective       Vital Signs Range for the last 24 hours  Temperature: Temp:  [98.4 °F (36.9 °C)] 98.4 °F (36.9 °C)   Temp Source: Temp src: Oral   BP:     Pulse:     Respirations: Resp:  [18] 18   SPO2:     O2 Amount (l/min):     O2 Devices     Weight: Weight:  [122 kg (269 lb)] 122 kg (269 lb)     Physical Examination:   General:   alert, appears stated age, and cooperative   Skin:   normal   HEENT:  normal   Lungs:   Normal respiratory effort, no respiratory distress   Heart:   Regular rate   Abdomen:  Soft, obese, gravid, nontender   Lower Extremeties  no edema   Pelvis:  Exam deferred     Fetal Heart Rate Assessment   Method:  External monitor   Beats/min:     Baseline:  140s   Varibility:  moderate   Accels:  Present (15x15)   Decels:  absent   Tracing Category:  I     Uterine Assessment   Method:  External toco   Frequency (min):  quiet   Ctx Count in 10 min:     Duration:     Intensity:     Intensity by IUPC:     Resting Tone:     Resting Tone by IUPC:     Lesage Units:       Laboratory Results:   Lab Results (last 24 hours)       ** No results found for the last 24 hours. **          Radiology Review:  Imaging Results (Last 24 Hours)       ** No results found for the last 24 hours. **          Other Studies:    Assessment & Plan       Previous  section        Assessment:  1.  Intrauterine pregnancy at 39w3d weeks gestation with reassuring fetal status.    2.  Previous  x 1  3.  Class 3 obesity (BMI 47)  4.  Hx depression    Plan:  1. Admit to LD for RCS; ancef 3g IV  preoperatively for surgical prophylaxis, SCDs for DVT ppx, plan PP lovenox  2. Plan of care has been reviewed with patient.  3.  Risks, benefits of treatment plan have been discussed.  4.  All questions have been answered.        Emma Barrera MD  10/27/2023  12:05 EDT

## 2023-10-27 NOTE — ANESTHESIA POSTPROCEDURE EVALUATION
Patient: Umu Queen    Procedure Summary       Date: 10/27/23 Room / Location: [facility] LABOR DELIVERY   [facility] LABOR DELIVERY    Anesthesia Start: 1246 Anesthesia Stop: 135    Procedure:  SECTION REPEAT (Abdomen) Diagnosis:     Surgeons: Emma Barrera MD Provider: Bryant Kumar DO    Anesthesia Type: spinal, ITN ASA Status: 2            Anesthesia Type: spinal, ITN    Vitals  Vitals Value Taken Time   BP 99/63 10/27/23 1355   Temp 97.6 °F (36.4 °C) 10/27/23 1353   Pulse 85 10/27/23 1357   Resp 18 10/27/23 1353   SpO2 92 % 10/27/23 1357   Vitals shown include unfiled device data.        Post Anesthesia Care and Evaluation    Patient location during evaluation: bedside  Patient participation: complete - patient participated  Level of consciousness: awake and alert  Pain management: adequate    Airway patency: patent  Anesthetic complications: No anesthetic complications    Cardiovascular status: acceptable  Respiratory status: acceptable  Hydration status: acceptable

## 2023-10-27 NOTE — ANESTHESIA PROCEDURE NOTES
Spinal Block      Patient reassessed immediately prior to procedure    Indication:procedure for pain  Performed By  CRNA/CAA: Chantal Pozo CRNA  Preanesthetic Checklist  Completed: patient identified, IV checked, risks and benefits discussed, surgical consent, monitors and equipment checked, pre-op evaluation and timeout performed  Spinal Block Prep:  Patient Position:sitting  Sterile Tech:cap, gloves, mask and sterile barriers  Prep:Betadine  Patient Monitoring:blood pressure monitoring, continuous pulse oximetry and EKG    Spinal Block Procedure  Approach:midline  Guidance:palpation technique  Location:L3-L4  Needle Type:Ritchie  Needle Gauge:25 G  Placement of Spinal needle event:cerebrospinal fluid aspirated  Paresthesia: no  Fluid Appearance:clear     Post Assessment  Patient Tolerance:patient tolerated the procedure well with no apparent complications  Complications no

## 2023-10-28 LAB
BASOPHILS # BLD AUTO: 0.01 10*3/MM3 (ref 0–0.2)
BASOPHILS NFR BLD AUTO: 0.1 % (ref 0–1.5)
DEPRECATED RDW RBC AUTO: 43.9 FL (ref 37–54)
EOSINOPHIL # BLD AUTO: 0.05 10*3/MM3 (ref 0–0.4)
EOSINOPHIL NFR BLD AUTO: 0.5 % (ref 0.3–6.2)
ERYTHROCYTE [DISTWIDTH] IN BLOOD BY AUTOMATED COUNT: 13.5 % (ref 12.3–15.4)
HCT VFR BLD AUTO: 30.8 % (ref 34–46.6)
HGB BLD-MCNC: 10.2 G/DL (ref 12–15.9)
IMM GRANULOCYTES # BLD AUTO: 0.02 10*3/MM3 (ref 0–0.05)
IMM GRANULOCYTES NFR BLD AUTO: 0.2 % (ref 0–0.5)
LYMPHOCYTES # BLD AUTO: 2.04 10*3/MM3 (ref 0.7–3.1)
LYMPHOCYTES NFR BLD AUTO: 21.2 % (ref 19.6–45.3)
MCH RBC QN AUTO: 29.6 PG (ref 26.6–33)
MCHC RBC AUTO-ENTMCNC: 33.1 G/DL (ref 31.5–35.7)
MCV RBC AUTO: 89.3 FL (ref 79–97)
MONOCYTES # BLD AUTO: 0.55 10*3/MM3 (ref 0.1–0.9)
MONOCYTES NFR BLD AUTO: 5.7 % (ref 5–12)
NEUTROPHILS NFR BLD AUTO: 6.96 10*3/MM3 (ref 1.7–7)
NEUTROPHILS NFR BLD AUTO: 72.3 % (ref 42.7–76)
NRBC BLD AUTO-RTO: 0 /100 WBC (ref 0–0.2)
PLATELET # BLD AUTO: 221 10*3/MM3 (ref 140–450)
PMV BLD AUTO: 11.9 FL (ref 6–12)
RBC # BLD AUTO: 3.45 10*6/MM3 (ref 3.77–5.28)
WBC NRBC COR # BLD: 9.63 10*3/MM3 (ref 3.4–10.8)

## 2023-10-28 PROCEDURE — 25010000002 ENOXAPARIN PER 10 MG: Performed by: OBSTETRICS & GYNECOLOGY

## 2023-10-28 PROCEDURE — 25010000002 KETOROLAC TROMETHAMINE PER 15 MG: Performed by: OBSTETRICS & GYNECOLOGY

## 2023-10-28 PROCEDURE — 85025 COMPLETE CBC W/AUTO DIFF WBC: CPT | Performed by: OBSTETRICS & GYNECOLOGY

## 2023-10-28 RX ORDER — OXYCODONE HYDROCHLORIDE 5 MG/1
5 TABLET ORAL EVERY 6 HOURS PRN
Qty: 10 TABLET | Refills: 0 | Status: SHIPPED | OUTPATIENT
Start: 2023-10-28 | End: 2023-11-03

## 2023-10-28 RX ORDER — PSEUDOEPHEDRINE HCL 30 MG
100 TABLET ORAL 2 TIMES DAILY PRN
Qty: 60 CAPSULE | Refills: 0 | Status: SHIPPED | OUTPATIENT
Start: 2023-10-28

## 2023-10-28 RX ORDER — IBUPROFEN 600 MG/1
600 TABLET ORAL EVERY 6 HOURS
Qty: 60 TABLET | Refills: 0 | Status: SHIPPED | OUTPATIENT
Start: 2023-10-28

## 2023-10-28 RX ADMIN — IBUPROFEN 600 MG: 600 TABLET, FILM COATED ORAL at 20:57

## 2023-10-28 RX ADMIN — KETOROLAC TROMETHAMINE 15 MG: 15 INJECTION, SOLUTION INTRAMUSCULAR; INTRAVENOUS at 03:12

## 2023-10-28 RX ADMIN — KETOROLAC TROMETHAMINE 15 MG: 15 INJECTION, SOLUTION INTRAMUSCULAR; INTRAVENOUS at 08:21

## 2023-10-28 RX ADMIN — ACETAMINOPHEN 1000 MG: 500 TABLET ORAL at 11:41

## 2023-10-28 RX ADMIN — POLYETHYLENE GLYCOL 3350 17 G: 17 POWDER, FOR SOLUTION ORAL at 08:21

## 2023-10-28 RX ADMIN — SIMETHICONE 80 MG: 80 TABLET, CHEWABLE ORAL at 08:21

## 2023-10-28 RX ADMIN — KETOROLAC TROMETHAMINE 15 MG: 15 INJECTION, SOLUTION INTRAMUSCULAR; INTRAVENOUS at 14:36

## 2023-10-28 RX ADMIN — ACETAMINOPHEN 650 MG: 325 TABLET ORAL at 18:14

## 2023-10-28 RX ADMIN — PRENATAL VITAMINS-IRON FUMARATE 27 MG IRON-FOLIC ACID 0.8 MG TABLET 1 TABLET: at 08:21

## 2023-10-28 RX ADMIN — ACETAMINOPHEN 1000 MG: 500 TABLET ORAL at 05:51

## 2023-10-28 RX ADMIN — OXYCODONE HYDROCHLORIDE 5 MG: 5 TABLET ORAL at 21:01

## 2023-10-28 RX ADMIN — ACETAMINOPHEN 1000 MG: 500 TABLET ORAL at 00:13

## 2023-10-28 NOTE — PROGRESS NOTES
[facility]  Obstetric Progress Note    Chief Complaint: POD 1    Subjective   Feeling well. Pain controlled. Francesco diet +amb. Lochia appr.     Objective     Vital Signs Range for the last 24 hours  Temp:  [97.6 °F (36.4 °C)-98.4 °F (36.9 °C)] 98.4 °F (36.9 °C)   BP: ()/(58-81) 100/60   Heart Rate:  [65-93] 85   Resp:  [16-20] 18   SpO2:  [89 %-98 %] 98 %       Device (Oxygen Therapy): room air       Intake/Output last 24 hours:      Intake/Output Summary (Last 24 hours) at 10/28/2023 0827  Last data filed at 10/28/2023 0658  Gross per 24 hour   Intake 1200 ml   Output 2317 ml   Net -1117 ml       Intake/Output this shift:    No intake/output data recorded.    Physical Exam:  General: No acute distress   Heart RRR   Lungs CTAB     Abdomen Soft, appropriately tender, fundus firm, incision CDI   Extremities Exam of extremities: pedal edema tr +       Laboratory Results:    WBC   Date Value Ref Range Status   10/28/2023 9.63 3.40 - 10.80 10*3/mm3 Final     RBC   Date Value Ref Range Status   10/28/2023 3.45 (L) 3.77 - 5.28 10*6/mm3 Final     Hemoglobin   Date Value Ref Range Status   10/28/2023 10.2 (L) 12.0 - 15.9 g/dL Final     Hematocrit   Date Value Ref Range Status   10/28/2023 30.8 (L) 34.0 - 46.6 % Final     MCV   Date Value Ref Range Status   10/28/2023 89.3 79.0 - 97.0 fL Final     MCH   Date Value Ref Range Status   10/28/2023 29.6 26.6 - 33.0 pg Final     MCHC   Date Value Ref Range Status   10/28/2023 33.1 31.5 - 35.7 g/dL Final     RDW   Date Value Ref Range Status   10/28/2023 13.5 12.3 - 15.4 % Final     RDW-SD   Date Value Ref Range Status   10/28/2023 43.9 37.0 - 54.0 fl Final     MPV   Date Value Ref Range Status   10/28/2023 11.9 6.0 - 12.0 fL Final     Platelets   Date Value Ref Range Status   10/28/2023 221 140 - 450 10*3/mm3 Final     Neutrophil %   Date Value Ref Range Status   10/28/2023 72.3 42.7 - 76.0 % Final     Lymphocyte %   Date Value Ref Range Status   10/28/2023 21.2 19.6 - 45.3 %  Final     Monocyte %   Date Value Ref Range Status   10/28/2023 5.7 5.0 - 12.0 % Final     Eosinophil %   Date Value Ref Range Status   10/28/2023 0.5 0.3 - 6.2 % Final     Basophil %   Date Value Ref Range Status   10/28/2023 0.1 0.0 - 1.5 % Final     Immature Grans %   Date Value Ref Range Status   10/28/2023 0.2 0.0 - 0.5 % Final     Neutrophils, Absolute   Date Value Ref Range Status   10/28/2023 6.96 1.70 - 7.00 10*3/mm3 Final     Lymphocytes, Absolute   Date Value Ref Range Status   10/28/2023 2.04 0.70 - 3.10 10*3/mm3 Final     Monocytes, Absolute   Date Value Ref Range Status   10/28/2023 0.55 0.10 - 0.90 10*3/mm3 Final     Eosinophils, Absolute   Date Value Ref Range Status   10/28/2023 0.05 0.00 - 0.40 10*3/mm3 Final     Basophils, Absolute   Date Value Ref Range Status   10/28/2023 0.01 0.00 - 0.20 10*3/mm3 Final     Immature Grans, Absolute   Date Value Ref Range Status   10/28/2023 0.02 0.00 - 0.05 10*3/mm3 Final     nRBC   Date Value Ref Range Status   10/28/2023 0.0 0.0 - 0.2 /100 WBC Final         Assessment/Plan: POD 1 s/p RCD  RPOC advance care  2. Breast  3. Male infant: no circ desired  4. Dc home tomorrow         Lizett Sargent MD  10/28/2023  08:27 EDT

## 2023-10-28 NOTE — LACTATION NOTE
10/28/23 1230   Maternal Information   Date of Referral 10/28/23   Person Making Referral lactation consultant  (Follow-up consult)   Maternal Reason for Referral breastfeeding unsuccessful in past  (First baby--attempted to breast-feed and worked on it for 3 months.  Pumped regularly after milk came in but never got more than drops.)   Infant Reason for Referral   (Reports this baby is breast-feeding much better than first baby and feels like she has more colostrum than last time.)   Milk Expression/Equipment   Breast Pump Type double electric, personal  (Mom does not have insurance.  Gave information about getting personal breast pump from local medical supply.  Will decide later if wants to get a breast pump. Offered to initiate pumping w/hospital pump to help with milk supply--mom declined for now.)   Breast Pumping   Breast Pumping Interventions post-feed pumping encouraged  (Encouraged to pump after breast feedings, if mom wants to try to improve milk supply.)     Mom is aware she may not make enough milk, but she is not sure that she wants to pump after feedings.  May just want to breast-feed and give formula, if she does not make enough milk.

## 2023-10-29 VITALS
WEIGHT: 269 LBS | HEIGHT: 63 IN | SYSTOLIC BLOOD PRESSURE: 114 MMHG | OXYGEN SATURATION: 98 % | RESPIRATION RATE: 16 BRPM | DIASTOLIC BLOOD PRESSURE: 72 MMHG | TEMPERATURE: 98.2 F | BODY MASS INDEX: 47.66 KG/M2 | HEART RATE: 54 BPM

## 2023-10-29 PROCEDURE — 90707 MMR VACCINE SC: CPT | Performed by: OBSTETRICS & GYNECOLOGY

## 2023-10-29 PROCEDURE — 90471 IMMUNIZATION ADMIN: CPT | Performed by: OBSTETRICS & GYNECOLOGY

## 2023-10-29 PROCEDURE — 25010000002 MEASLES, MUMPS & RUBELLA VAC RECONSTITUTED SOLUTION: Performed by: OBSTETRICS & GYNECOLOGY

## 2023-10-29 RX ADMIN — PRENATAL VITAMINS-IRON FUMARATE 27 MG IRON-FOLIC ACID 0.8 MG TABLET 1 TABLET: at 08:27

## 2023-10-29 RX ADMIN — POLYETHYLENE GLYCOL 3350 17 G: 17 POWDER, FOR SOLUTION ORAL at 08:27

## 2023-10-29 RX ADMIN — IBUPROFEN 600 MG: 600 TABLET, FILM COATED ORAL at 08:27

## 2023-10-29 RX ADMIN — MEASLES, MUMPS, AND RUBELLA VIRUS VACCINE LIVE 0.5 ML: 1000; 12500; 1000 INJECTION, POWDER, LYOPHILIZED, FOR SUSPENSION SUBCUTANEOUS at 10:14

## 2023-10-29 RX ADMIN — ACETAMINOPHEN 650 MG: 325 TABLET ORAL at 06:12

## 2023-10-29 RX ADMIN — IBUPROFEN 600 MG: 600 TABLET, FILM COATED ORAL at 03:25

## 2023-10-29 RX ADMIN — ACETAMINOPHEN 650 MG: 325 TABLET ORAL at 01:30

## 2023-10-29 RX ADMIN — ACETAMINOPHEN 650 MG: 325 TABLET ORAL at 11:02

## 2023-10-29 NOTE — PLAN OF CARE
Goal Outcome Evaluation:  Plan of Care Reviewed With: patient        Progress: no change  Outcome Evaluation: incision well approximated mack

## 2023-10-29 NOTE — PROGRESS NOTES
[facility]  Obstetric Progress Note    Chief Complaint: POD 2    Subjective   Feeling well. Pain controlled. Francesco diet +amb. Lochia appr.     Objective     Vital Signs Range for the last 24 hours  Temp:  [98.2 °F (36.8 °C)-98.6 °F (37 °C)] 98.2 °F (36.8 °C)   BP: (108-125)/(57-80) 114/72   Heart Rate:  [54-96] 54   Resp:  [16-18] 16                   Intake/Output last 24 hours:      Intake/Output Summary (Last 24 hours) at 10/29/2023 0945  Last data filed at 10/28/2023 1456  Gross per 24 hour   Intake --   Output 725 ml   Net -725 ml       Intake/Output this shift:    No intake/output data recorded.    Physical Exam:  General: No acute distress   Heart RRR   Lungs CTAB     Abdomen Soft, appropriately tender, fundus firm, incision CDI   Extremities Exam of extremities: pedal edema tr +       Laboratory Results:    No new    Assessment/Plan: POD 2 s/p RCD  RPOC advance care  2. breast  3. Male infant: no circ  4. Dc home        Lizett Sargent MD  10/29/2023  09:45 EDT

## 2023-10-29 NOTE — LACTATION NOTE
10/29/23 1040   Maternal Information   Date of Referral 10/29/23   Person Making Referral lactation consultant  (follow up)   Maternal Reason for Referral breastfeeding unsuccessful in past   Infant Reason for Referral  infant   Maternal Assessment   Breast Shape Bilateral:;wide;tubular   Breast Density Bilateral:;soft   Nipples Bilateral:;short  (small nipple shield)   Left Nipple Symptoms intact;nontender   Right Nipple Symptoms intact;nontender   Maternal Infant Feeding   Maternal Emotional State receptive;relaxed   Milk Expression/Equipment   Breast Pump Type other (see comments)  (discussed hand expression.  patient stated that she did not enjoy pumping after 1st child.  Discussed hand expression as another option.)   Breast Pumping   Breast Pumping Interventions post-feed pumping encouraged  (encouraged to hand express or pump after every feeding, due to patient's prior low milk supply with 1st child.)     All questions/concerns answered at this time.  Encouraged outpatient lactation clinic after discharge.

## 2023-10-29 NOTE — DISCHARGE SUMMARY
[facility]  Discharge Summary      Patient: Umu Queen            : 1990  MRN: [id number]  CSN: [id number]  Consult:   Consults       No orders found from 2023 to 10/28/2023.               Gestational Age at Discharge:  39w3d, delivered      Admission  Diagnosis: Previous  section    Discharge Diagnosis:   Patient Active Problem List   Diagnosis    S/P repeat low transverse        Date of Admission: 10/27/2023    Date of Discharge:  10/29/23    Procedures:  R CD           Service:  Obstetrics    Hospital Course:       Pt admitted for  RCD  after uncomplicated pregnancy. She was delivered without complication. She delivered a VMI with Apgars 8/9 via RCD. See note for full detail. Her postpartum course was uncomplicated and was dc'd home on PPD 2    Labs:    Lab Results (last 24 hours)       ** No results found for the last 24 hours. **          HOSPITAL LABS:  Lab Results   Component Value Date    WBC 9.63 10/28/2023    HGB 10.2 (L) 10/28/2023    HCT 30.8 (L) 10/28/2023    MCV 89.3 10/28/2023     10/28/2023     Results from last 7 days   Lab Units 10/27/23  1118   ABO TYPING  A   RH TYPING  Positive   ANTIBODY SCREEN  Negative       IMAGING        Discharge Medications     Discharge Medications        New Medications        Instructions Start Date   docusate sodium 100 MG capsule   100 mg, Oral, 2 Times Daily PRN      ibuprofen 600 MG tablet  Commonly known as: ADVIL,MOTRIN   600 mg, Oral, Every 6 Hours      oxyCODONE 5 MG immediate release tablet  Commonly known as: ROXICODONE   5 mg, Oral, Every 6 Hours PRN             Continue These Medications        Instructions Start Date   Prenatal 27- 27-1 MG tablet tablet   Oral, Daily               Allergies: No Known Allergies     Discharge Disposition:  To Home    Discharge Condition:  Stable    Discharge Diet: Regular    Activity at Discharge: pelvic rest, no lifting    Follow-up Appointments: 2 wk        Lizett ROCHA  MD [place]  10/29/23  09:46 EDT

## 2024-01-15 ENCOUNTER — HOSPITAL ENCOUNTER (INPATIENT)
Facility: HOSPITAL | Age: 34
LOS: 2 days | Discharge: HOME | End: 2024-01-18
Attending: EMERGENCY MEDICINE | Admitting: INTERNAL MEDICINE

## 2024-01-15 ENCOUNTER — APPOINTMENT (OUTPATIENT)
Dept: CT IMAGING | Facility: HOSPITAL | Age: 34
End: 2024-01-15

## 2024-01-15 ENCOUNTER — APPOINTMENT (OUTPATIENT)
Dept: GENERAL RADIOLOGY | Facility: HOSPITAL | Age: 34
End: 2024-01-15

## 2024-01-15 ENCOUNTER — APPOINTMENT (OUTPATIENT)
Dept: ULTRASOUND IMAGING | Facility: HOSPITAL | Age: 34
End: 2024-01-15

## 2024-01-15 DIAGNOSIS — K83.1 BILIARY OBSTRUCTION: Primary | ICD-10-CM

## 2024-01-15 DIAGNOSIS — K81.0 ACUTE CHOLECYSTITIS: ICD-10-CM

## 2024-01-15 DIAGNOSIS — K82.8 PORCELAIN GALLBLADDER: ICD-10-CM

## 2024-01-15 DIAGNOSIS — K80.51: ICD-10-CM

## 2024-01-15 PROBLEM — R79.89 ELEVATED LFTS: Status: ACTIVE | Noted: 2024-01-15

## 2024-01-15 LAB
ALBUMIN SERPL-MCNC: 4.3 G/DL (ref 3.5–5.2)
ALBUMIN/GLOB SERPL: 1.2 G/DL
ALP SERPL-CCNC: 295 U/L (ref 39–117)
ALT SERPL W P-5'-P-CCNC: 763 U/L (ref 1–33)
ANION GAP SERPL CALCULATED.3IONS-SCNC: 16 MMOL/L (ref 5–15)
AST SERPL-CCNC: 305 U/L (ref 1–32)
B-HCG UR QL: NEGATIVE
BASOPHILS # BLD AUTO: 0.02 10*3/MM3 (ref 0–0.2)
BASOPHILS NFR BLD AUTO: 0.3 % (ref 0–1.5)
BILIRUB SERPL-MCNC: 5.5 MG/DL (ref 0–1.2)
BUN SERPL-MCNC: 8 MG/DL (ref 6–20)
BUN/CREAT SERPL: 12.5 (ref 7–25)
CALCIUM SPEC-SCNC: 9 MG/DL (ref 8.6–10.5)
CHLORIDE SERPL-SCNC: 101 MMOL/L (ref 98–107)
CO2 SERPL-SCNC: 19 MMOL/L (ref 22–29)
CREAT SERPL-MCNC: 0.64 MG/DL (ref 0.57–1)
DEPRECATED RDW RBC AUTO: 44.2 FL (ref 37–54)
EGFRCR SERPLBLD CKD-EPI 2021: 121.3 ML/MIN/1.73
EOSINOPHIL # BLD AUTO: 0.15 10*3/MM3 (ref 0–0.4)
EOSINOPHIL NFR BLD AUTO: 2.3 % (ref 0.3–6.2)
ERYTHROCYTE [DISTWIDTH] IN BLOOD BY AUTOMATED COUNT: 13.4 % (ref 12.3–15.4)
EXPIRATION DATE: NORMAL
GLOBULIN UR ELPH-MCNC: 3.5 GM/DL
GLUCOSE SERPL-MCNC: 92 MG/DL (ref 65–99)
HCG INTACT+B SERPL-ACNC: <0.1 MIU/ML
HCT VFR BLD AUTO: 40 % (ref 34–46.6)
HGB BLD-MCNC: 13.1 G/DL (ref 12–15.9)
HOLD SPECIMEN: NORMAL
IMM GRANULOCYTES # BLD AUTO: 0.02 10*3/MM3 (ref 0–0.05)
IMM GRANULOCYTES NFR BLD AUTO: 0.3 % (ref 0–0.5)
INTERNAL NEGATIVE CONTROL: NORMAL
INTERNAL POSITIVE CONTROL: NORMAL
LIPASE SERPL-CCNC: 37 U/L (ref 13–60)
LYMPHOCYTES # BLD AUTO: 2.13 10*3/MM3 (ref 0.7–3.1)
LYMPHOCYTES NFR BLD AUTO: 33.2 % (ref 19.6–45.3)
Lab: NORMAL
MCH RBC QN AUTO: 29.5 PG (ref 26.6–33)
MCHC RBC AUTO-ENTMCNC: 32.8 G/DL (ref 31.5–35.7)
MCV RBC AUTO: 90.1 FL (ref 79–97)
MONOCYTES # BLD AUTO: 0.38 10*3/MM3 (ref 0.1–0.9)
MONOCYTES NFR BLD AUTO: 5.9 % (ref 5–12)
NEUTROPHILS NFR BLD AUTO: 3.72 10*3/MM3 (ref 1.7–7)
NEUTROPHILS NFR BLD AUTO: 58 % (ref 42.7–76)
NRBC BLD AUTO-RTO: 0 /100 WBC (ref 0–0.2)
NT-PROBNP SERPL-MCNC: 39.7 PG/ML (ref 0–450)
PLATELET # BLD AUTO: 343 10*3/MM3 (ref 140–450)
PMV BLD AUTO: 11 FL (ref 6–12)
POTASSIUM SERPL-SCNC: 3.9 MMOL/L (ref 3.5–5.2)
PROT SERPL-MCNC: 7.8 G/DL (ref 6–8.5)
RBC # BLD AUTO: 4.44 10*6/MM3 (ref 3.77–5.28)
SODIUM SERPL-SCNC: 136 MMOL/L (ref 136–145)
TROPONIN T SERPL HS-MCNC: <6 NG/L
WBC NRBC COR # BLD AUTO: 6.42 10*3/MM3 (ref 3.4–10.8)
WHOLE BLOOD HOLD COAG: NORMAL
WHOLE BLOOD HOLD SPECIMEN: NORMAL

## 2024-01-15 PROCEDURE — 84484 ASSAY OF TROPONIN QUANT: CPT | Performed by: EMERGENCY MEDICINE

## 2024-01-15 PROCEDURE — 99222 1ST HOSP IP/OBS MODERATE 55: CPT | Performed by: FAMILY MEDICINE

## 2024-01-15 PROCEDURE — 81025 URINE PREGNANCY TEST: CPT | Performed by: EMERGENCY MEDICINE

## 2024-01-15 PROCEDURE — 25810000003 SODIUM CHLORIDE 0.9 % SOLUTION: Performed by: EMERGENCY MEDICINE

## 2024-01-15 PROCEDURE — 25010000002 ONDANSETRON PER 1 MG: Performed by: EMERGENCY MEDICINE

## 2024-01-15 PROCEDURE — 25510000001 IOPAMIDOL 61 % SOLUTION: Performed by: EMERGENCY MEDICINE

## 2024-01-15 PROCEDURE — 25010000002 MORPHINE PER 10 MG: Performed by: EMERGENCY MEDICINE

## 2024-01-15 PROCEDURE — 84702 CHORIONIC GONADOTROPIN TEST: CPT | Performed by: EMERGENCY MEDICINE

## 2024-01-15 PROCEDURE — 74177 CT ABD & PELVIS W/CONTRAST: CPT

## 2024-01-15 PROCEDURE — 71045 X-RAY EXAM CHEST 1 VIEW: CPT

## 2024-01-15 PROCEDURE — 83690 ASSAY OF LIPASE: CPT | Performed by: EMERGENCY MEDICINE

## 2024-01-15 PROCEDURE — 99285 EMERGENCY DEPT VISIT HI MDM: CPT

## 2024-01-15 PROCEDURE — 80053 COMPREHEN METABOLIC PANEL: CPT | Performed by: EMERGENCY MEDICINE

## 2024-01-15 PROCEDURE — 83880 ASSAY OF NATRIURETIC PEPTIDE: CPT | Performed by: EMERGENCY MEDICINE

## 2024-01-15 PROCEDURE — 93005 ELECTROCARDIOGRAM TRACING: CPT | Performed by: EMERGENCY MEDICINE

## 2024-01-15 PROCEDURE — 76705 ECHO EXAM OF ABDOMEN: CPT

## 2024-01-15 PROCEDURE — 85025 COMPLETE CBC W/AUTO DIFF WBC: CPT | Performed by: EMERGENCY MEDICINE

## 2024-01-15 RX ORDER — ONDANSETRON 2 MG/ML
4 INJECTION INTRAMUSCULAR; INTRAVENOUS ONCE
Status: COMPLETED | OUTPATIENT
Start: 2024-01-15 | End: 2024-01-15

## 2024-01-15 RX ORDER — SODIUM CHLORIDE 0.9 % (FLUSH) 0.9 %
10 SYRINGE (ML) INJECTION AS NEEDED
Status: DISCONTINUED | OUTPATIENT
Start: 2024-01-15 | End: 2024-01-18 | Stop reason: HOSPADM

## 2024-01-15 RX ORDER — MORPHINE SULFATE 4 MG/ML
4 INJECTION, SOLUTION INTRAMUSCULAR; INTRAVENOUS ONCE
Status: COMPLETED | OUTPATIENT
Start: 2024-01-15 | End: 2024-01-15

## 2024-01-15 RX ADMIN — SODIUM CHLORIDE 1000 ML: 9 INJECTION, SOLUTION INTRAVENOUS at 18:57

## 2024-01-15 RX ADMIN — MORPHINE SULFATE 4 MG: 4 INJECTION, SOLUTION INTRAMUSCULAR; INTRAVENOUS at 18:57

## 2024-01-15 RX ADMIN — IOPAMIDOL 85 ML: 612 INJECTION, SOLUTION INTRAVENOUS at 20:54

## 2024-01-15 RX ADMIN — ONDANSETRON 4 MG: 2 INJECTION INTRAMUSCULAR; INTRAVENOUS at 18:57

## 2024-01-15 NOTE — ED PROVIDER NOTES
Subjective   History of Present Illness  This is a 33-year-old female with past medical history of ovarian cyst presented to the emergency department with some right upper abdominal discomfort.  Patient has had the symptoms for the last 10 days or so.  She has been having some cramping up in the area.  The patient states that it is intensified since then.  She also complains of some yellowing of the skin and gray stools.  The patient is been nauseous as well.  Denies any vomiting.  She has a history of a  section, however no other abdominal surgeries.  She denies any fevers or chills.  No headache or change in vision.  No focal weakness.  No chest pain or shortness of breath.    History provided by:  Patient   used: No        Review of Systems   Constitutional:  Negative for chills and fever.   HENT:  Negative for congestion, ear pain and sore throat.    Eyes:  Negative for visual disturbance.   Respiratory:  Negative for shortness of breath.    Cardiovascular:  Negative for chest pain.   Gastrointestinal:  Positive for abdominal pain and nausea.   Genitourinary:  Negative for difficulty urinating.   Musculoskeletal:  Negative for arthralgias.   Skin:  Negative for rash.   Neurological:  Negative for dizziness, weakness and numbness.   Psychiatric/Behavioral:  Negative for agitation.        Past Medical History:   Diagnosis Date    Anemia     Anesthesia complication     difficult to wake up    Anxiety     Depression     Gestational hypertension     1st pregnancy    Ovarian cyst     right    PONV (postoperative nausea and vomiting)     Seizures     as a child, hasnt had any seizures in 10 years.       No Known Allergies    Past Surgical History:   Procedure Laterality Date     SECTION       SECTION N/A 10/27/2023    Procedure:  SECTION REPEAT;  Surgeon: Emma Barrera MD;  Location: [facility] LABOR DELIVERY;  Service: Obstetrics;  Laterality: N/A;    HEMORRHOIDECTOMY          History reviewed. No pertinent family history.    Social History     Socioeconomic History    Marital status:    Tobacco Use    Smoking status: Never     Passive exposure: Never    Smokeless tobacco: Never   Vaping Use    Vaping Use: Never used   Substance and Sexual Activity    Alcohol use: Not Currently    Drug use: Never    Sexual activity: Defer           Objective   Physical Exam  Vitals and nursing note reviewed.   Constitutional:       General: She is not in acute distress.     Appearance: She is not ill-appearing or toxic-appearing.   HENT:      Mouth/Throat:      Pharynx: No posterior oropharyngeal erythema.   Eyes:      Conjunctiva/sclera: Conjunctivae normal.      Pupils: Pupils are equal, round, and reactive to light.   Cardiovascular:      Rate and Rhythm: Normal rate and regular rhythm.   Pulmonary:      Effort: Pulmonary effort is normal. No respiratory distress.   Abdominal:      General: Abdomen is flat. There is no distension.      Palpations: There is no mass.      Tenderness: There is abdominal tenderness in the right upper quadrant and epigastric area. There is no guarding or rebound.   Musculoskeletal:         General: No deformity. Normal range of motion.   Skin:     General: Skin is warm.      Findings: No rash.   Neurological:      General: No focal deficit present.      Mental Status: She is alert and oriented to person, place, and time.      Motor: No weakness.         ECG 12 Lead      Date/Time: 1/15/2024 6:28 PM    Performed by: Vinicio Cardozo MD  Authorized by: Vinicio Cardozo MD  Interpreted by physician  Comparison: compared with previous ECG   Similar to previous ECG  Rhythm: sinus rhythm  Rate: normal  BPM: 79  QRS axis: normal  Conduction: conduction normal  ST Segments: ST segments normal  Other: no other findings  Clinical impression: normal ECG  Comments: EKG was directly visualized by myself, interpretations as documented in hospital course.                ED Course  ED Course as of 01/15/24 2235   Mon Keyur 15, 2024   1830 BP: 145/100 [JK]   1830 Temp: 97.6 °F (36.4 °C) [JK]   1830 Temp src: Oral [JK]   1830 Heart Rate: 85 [JK]   1830 Resp: 16 [JK]   1830 Device (Oxygen Therapy): room air  Patient's repeat vitals, telemetry tracing, and pulse oximetry tracing were directly viewed and interpreted by myself.  Patient is hemodynamically stable [JK]   2233 CBC & Differential [JK]   2233 hCG, Quantitative, Pregnancy [JK]   2233 Comprehensive Metabolic Panel(!) [JK]   2233 POC Urine Pregnancy [JK]   2233 Lipase [JK]   2233 Single High Sensitivity Troponin T [JK]   2233 BNP  Laboratory studies were reviewed and interpreted directly by myself.  CBC was unremarkable, hCG negative, CMP showed some significant elevation in ALT at 763, AST of 305, alk phos of 295, bilirubin of 5.5, pregnancy was negative, lipase normal, troponin normal, BNP normal [JK]   2234 US Gallbladder [JK]   2234 CT Abdomen Pelvis With Contrast [JK]   2234 XR Chest 1 View [JK]   2234 Imaging was directly visualized by myself, per my interpretations, chest x-ray was normal, ultrasound of the gallbladder and CT of the abdomen pelvis with contrast revealed porcelain gallbladder and dilated biliary duct.. [JK]   2234 On reevaluation, the patient's pain is much more controlled.  Findings are consistent with a biliary obstruction.  She has no findings concerning for acute cholecystitis.  Patient be continued on pain meds and antiemetics.  Will be admitted to the hospital for further evaluation and treatment. [JK]   2234 Based on the patient's presentation, history and diffuse work-up in the emergency department, the patient is deemed appropriate for admission to the hospital for further evaluation and treatment.  This was discussed with the patient at bedside.  They are in agreement with the current medical management.    Admitting physician: Dr. Zepeda    Discussion was had with admitting physician regarding  the laboratory and imaging findings.  We did discuss current therapeutics in the emergency department and progression of the patient.  Working diagnosis was conveyed to the admitting physician, as well as current status and prognosis for the patient.  They are in agreement with these findings and have accepted admission.    Shared decision making:   After full review of the patient's clinical presentation, review of any work-up including but not limited to laboratory studies and radiology obtained, I had a discussion with the patient.  Treatment options were discussed as well as the risks, benefits and consequences.  I discussed all findings with the patient and family members if available.  During the discussion, treatment goals were understood by all as well as any misconceptions which were addressed with the patient.  Ample time was given for any questions they may have had.  They are in agreement with the treatment plan as well as final disposition. [JK]      ED Course User Index  [JK] Vinicio Cardozo MD                                             Medical Decision Making    This is a 33-year-old female with a history of ovarian cyst presented to the emergency department with some abdominal pain, nausea, vomiting and jaundice.  The patient symptoms seem concerning for biliary colic and possibly acute cholecystitis.  Patient does have some stones on examination.  He has focal tenderness in that area.  Is no evidence of peritonitis or acute abdomen.  Patient is afebrile.  Hemodynamically stable.  IV access will be established.  Workup initiated.      Differential diagnosis: Peptic ulcer disease, dyspepsia, GERD, pancreatitis, biliary colic, electrolyte abnormality, acute kidney injury      Amount and/or Complexity of Data Reviewed  External Data Reviewed: labs, radiology, ECG and notes.     Details: External laboratories, imaging as well as notes were reviewed personally by myself.  All relevant studies were  used to guide decision making.     Date of previous record: 10/27/2023    Source of note: Admission record    Summary: Patient was admitted OPA for low  section.  I did review basic laboratory studies on file as well as a previous chest x-ray and EKG.    Labs: ordered. Decision-making details documented in ED Course.  Radiology: ordered and independent interpretation performed. Decision-making details documented in ED Course.  ECG/medicine tests: ordered and independent interpretation performed. Decision-making details documented in ED Course.    Risk  Prescription drug management.        Final diagnoses:   Biliary obstruction   Porcelain gallbladder       ED Disposition  ED Disposition       ED Disposition   Decision to Admit    Condition   --    Comment   --               No follow-up provider specified.       Medication List      No changes were made to your prescriptions during this visit.            Vinicio Cardozo MD  01/15/24 0193

## 2024-01-15 NOTE — Clinical Note
Level of Care: Med/Surg [1]   Diagnosis: Dilation of common bile duct [[id number]]   Admitting Physician: YEIMY MARADIAGA [[id number]]

## 2024-01-16 ENCOUNTER — APPOINTMENT (OUTPATIENT)
Dept: GENERAL RADIOLOGY | Facility: HOSPITAL | Age: 34
End: 2024-01-16

## 2024-01-16 ENCOUNTER — APPOINTMENT (OUTPATIENT)
Dept: MRI IMAGING | Facility: HOSPITAL | Age: 34
End: 2024-01-16

## 2024-01-16 ENCOUNTER — ANESTHESIA (OUTPATIENT)
Dept: PERIOP | Facility: HOSPITAL | Age: 34
End: 2024-01-16

## 2024-01-16 ENCOUNTER — ANESTHESIA EVENT (OUTPATIENT)
Dept: PERIOP | Facility: HOSPITAL | Age: 34
End: 2024-01-16

## 2024-01-16 PROBLEM — K83.8 DILATION OF COMMON BILE DUCT: Status: ACTIVE | Noted: 2024-01-16

## 2024-01-16 LAB
ALBUMIN SERPL-MCNC: 3.6 G/DL (ref 3.5–5.2)
ALP SERPL-CCNC: 237 U/L (ref 39–117)
ALT SERPL W P-5'-P-CCNC: 528 U/L (ref 1–33)
ANION GAP SERPL CALCULATED.3IONS-SCNC: 13 MMOL/L (ref 5–15)
AST SERPL-CCNC: 164 U/L (ref 1–32)
B-HCG UR QL: NEGATIVE
BASOPHILS # BLD AUTO: 0.01 10*3/MM3 (ref 0–0.2)
BASOPHILS NFR BLD AUTO: 0.2 % (ref 0–1.5)
BILIRUB CONJ SERPL-MCNC: 3.9 MG/DL (ref 0–0.3)
BILIRUB INDIRECT SERPL-MCNC: 0.2 MG/DL
BILIRUB SERPL-MCNC: 4.1 MG/DL (ref 0–1.2)
BUN SERPL-MCNC: 7 MG/DL (ref 6–20)
BUN/CREAT SERPL: 11.5 (ref 7–25)
CALCIUM SPEC-SCNC: 8.4 MG/DL (ref 8.6–10.5)
CHLORIDE SERPL-SCNC: 109 MMOL/L (ref 98–107)
CO2 SERPL-SCNC: 22 MMOL/L (ref 22–29)
CREAT SERPL-MCNC: 0.61 MG/DL (ref 0.57–1)
DEPRECATED RDW RBC AUTO: 44.5 FL (ref 37–54)
EGFRCR SERPLBLD CKD-EPI 2021: 122.8 ML/MIN/1.73
EOSINOPHIL # BLD AUTO: 0.17 10*3/MM3 (ref 0–0.4)
EOSINOPHIL NFR BLD AUTO: 2.6 % (ref 0.3–6.2)
ERYTHROCYTE [DISTWIDTH] IN BLOOD BY AUTOMATED COUNT: 13.5 % (ref 12.3–15.4)
EXPIRATION DATE: NORMAL
GLUCOSE SERPL-MCNC: 89 MG/DL (ref 65–99)
HCT VFR BLD AUTO: 36.7 % (ref 34–46.6)
HGB BLD-MCNC: 11.9 G/DL (ref 12–15.9)
IMM GRANULOCYTES # BLD AUTO: 0.02 10*3/MM3 (ref 0–0.05)
IMM GRANULOCYTES NFR BLD AUTO: 0.3 % (ref 0–0.5)
INTERNAL NEGATIVE CONTROL: NEGATIVE
INTERNAL POSITIVE CONTROL: POSITIVE
LYMPHOCYTES # BLD AUTO: 2.47 10*3/MM3 (ref 0.7–3.1)
LYMPHOCYTES NFR BLD AUTO: 38 % (ref 19.6–45.3)
Lab: NORMAL
MCH RBC QN AUTO: 29.2 PG (ref 26.6–33)
MCHC RBC AUTO-ENTMCNC: 32.4 G/DL (ref 31.5–35.7)
MCV RBC AUTO: 90 FL (ref 79–97)
MONOCYTES # BLD AUTO: 0.44 10*3/MM3 (ref 0.1–0.9)
MONOCYTES NFR BLD AUTO: 6.8 % (ref 5–12)
NEUTROPHILS NFR BLD AUTO: 3.39 10*3/MM3 (ref 1.7–7)
NEUTROPHILS NFR BLD AUTO: 52.1 % (ref 42.7–76)
NRBC BLD AUTO-RTO: 0 /100 WBC (ref 0–0.2)
PLATELET # BLD AUTO: 311 10*3/MM3 (ref 140–450)
PMV BLD AUTO: 10.9 FL (ref 6–12)
POTASSIUM SERPL-SCNC: 3.6 MMOL/L (ref 3.5–5.2)
PROT SERPL-MCNC: 6.3 G/DL (ref 6–8.5)
RBC # BLD AUTO: 4.08 10*6/MM3 (ref 3.77–5.28)
SODIUM SERPL-SCNC: 144 MMOL/L (ref 136–145)
WBC NRBC COR # BLD AUTO: 6.5 10*3/MM3 (ref 3.4–10.8)

## 2024-01-16 PROCEDURE — 25810000003 LACTATED RINGERS PER 1000 ML: Performed by: ANESTHESIOLOGY

## 2024-01-16 PROCEDURE — 25010000002 PROPOFOL 10 MG/ML EMULSION: Performed by: ANESTHESIOLOGY

## 2024-01-16 PROCEDURE — 74181 MRI ABDOMEN W/O CONTRAST: CPT

## 2024-01-16 PROCEDURE — 0FT44ZZ RESECTION OF GALLBLADDER, PERCUTANEOUS ENDOSCOPIC APPROACH: ICD-10-PCS | Performed by: SURGERY

## 2024-01-16 PROCEDURE — C1726 CATH, BAL DIL, NON-VASCULAR: HCPCS | Performed by: SURGERY

## 2024-01-16 PROCEDURE — 81025 URINE PREGNANCY TEST: CPT | Performed by: ANESTHESIOLOGY

## 2024-01-16 PROCEDURE — 25010000002 CEFAZOLIN PER 500 MG: Performed by: SURGERY

## 2024-01-16 PROCEDURE — 74300 X-RAY BILE DUCTS/PANCREAS: CPT

## 2024-01-16 PROCEDURE — 25810000003 SODIUM CHLORIDE PER 500 ML: Performed by: SURGERY

## 2024-01-16 PROCEDURE — 25010000002 MORPHINE PER 10 MG: Performed by: SURGERY

## 2024-01-16 PROCEDURE — 25010000002 HYDROMORPHONE PER 4 MG: Performed by: SURGERY

## 2024-01-16 PROCEDURE — 25010000002 ONDANSETRON PER 1 MG: Performed by: ANESTHESIOLOGY

## 2024-01-16 PROCEDURE — 25010000002 HYDROMORPHONE 1 MG/ML SOLUTION

## 2024-01-16 PROCEDURE — 25010000002 SUGAMMADEX 200 MG/2ML SOLUTION: Performed by: ANESTHESIOLOGY

## 2024-01-16 PROCEDURE — 25010000002 FENTANYL CITRATE (PF) 50 MCG/ML SOLUTION

## 2024-01-16 PROCEDURE — 88304 TISSUE EXAM BY PATHOLOGIST: CPT | Performed by: SURGERY

## 2024-01-16 PROCEDURE — 25010000002 FENTANYL CITRATE (PF) 100 MCG/2ML SOLUTION: Performed by: ANESTHESIOLOGY

## 2024-01-16 PROCEDURE — 25510000001 IOPAMIDOL 61 % SOLUTION: Performed by: SURGERY

## 2024-01-16 PROCEDURE — 25010000002 DEXAMETHASONE PER 1 MG: Performed by: ANESTHESIOLOGY

## 2024-01-16 PROCEDURE — 80076 HEPATIC FUNCTION PANEL: CPT | Performed by: PHYSICIAN ASSISTANT

## 2024-01-16 PROCEDURE — 25810000003 LACTATED RINGERS PER 1000 ML: Performed by: NURSE PRACTITIONER

## 2024-01-16 PROCEDURE — 99222 1ST HOSP IP/OBS MODERATE 55: CPT | Performed by: PHYSICIAN ASSISTANT

## 2024-01-16 PROCEDURE — 99232 SBSQ HOSP IP/OBS MODERATE 35: CPT | Performed by: INTERNAL MEDICINE

## 2024-01-16 PROCEDURE — 85025 COMPLETE CBC W/AUTO DIFF WBC: CPT | Performed by: NURSE PRACTITIONER

## 2024-01-16 PROCEDURE — 80048 BASIC METABOLIC PNL TOTAL CA: CPT | Performed by: NURSE PRACTITIONER

## 2024-01-16 PROCEDURE — 25010000002 MORPHINE PER 10 MG: Performed by: NURSE PRACTITIONER

## 2024-01-16 DEVICE — LIGACLIP 10-M/L, 10MM ENDOSCOPIC ROTATING MULTIPLE CLIP APPLIERS
Type: IMPLANTABLE DEVICE | Site: ABDOMEN | Status: FUNCTIONAL
Brand: LIGACLIP

## 2024-01-16 RX ORDER — ONDANSETRON 2 MG/ML
4 INJECTION INTRAMUSCULAR; INTRAVENOUS EVERY 6 HOURS PRN
Status: DISCONTINUED | OUTPATIENT
Start: 2024-01-16 | End: 2024-01-18 | Stop reason: HOSPADM

## 2024-01-16 RX ORDER — FAMOTIDINE 10 MG/ML
20 INJECTION, SOLUTION INTRAVENOUS
Status: COMPLETED | OUTPATIENT
Start: 2024-01-16 | End: 2024-01-16

## 2024-01-16 RX ORDER — BUPIVACAINE HYDROCHLORIDE AND EPINEPHRINE 2.5; 5 MG/ML; UG/ML
INJECTION, SOLUTION EPIDURAL; INFILTRATION; INTRACAUDAL; PERINEURAL AS NEEDED
Status: DISCONTINUED | OUTPATIENT
Start: 2024-01-16 | End: 2024-01-16 | Stop reason: HOSPADM

## 2024-01-16 RX ORDER — DEXAMETHASONE SODIUM PHOSPHATE 4 MG/ML
INJECTION, SOLUTION INTRA-ARTICULAR; INTRALESIONAL; INTRAMUSCULAR; INTRAVENOUS; SOFT TISSUE AS NEEDED
Status: DISCONTINUED | OUTPATIENT
Start: 2024-01-16 | End: 2024-01-16 | Stop reason: SURG

## 2024-01-16 RX ORDER — SODIUM CHLORIDE, SODIUM LACTATE, POTASSIUM CHLORIDE, CALCIUM CHLORIDE 600; 310; 30; 20 MG/100ML; MG/100ML; MG/100ML; MG/100ML
INJECTION, SOLUTION INTRAVENOUS CONTINUOUS PRN
Status: DISCONTINUED | OUTPATIENT
Start: 2024-01-16 | End: 2024-01-16 | Stop reason: SURG

## 2024-01-16 RX ORDER — PHENYLEPHRINE HCL IN 0.9% NACL 1 MG/10 ML
SYRINGE (ML) INTRAVENOUS AS NEEDED
Status: DISCONTINUED | OUTPATIENT
Start: 2024-01-16 | End: 2024-01-16 | Stop reason: SURG

## 2024-01-16 RX ORDER — SODIUM CHLORIDE 9 MG/ML
40 INJECTION, SOLUTION INTRAVENOUS AS NEEDED
Status: DISCONTINUED | OUTPATIENT
Start: 2024-01-16 | End: 2024-01-18 | Stop reason: HOSPADM

## 2024-01-16 RX ORDER — BISACODYL 10 MG
10 SUPPOSITORY, RECTAL RECTAL DAILY PRN
Status: DISCONTINUED | OUTPATIENT
Start: 2024-01-16 | End: 2024-01-18 | Stop reason: HOSPADM

## 2024-01-16 RX ORDER — FENTANYL CITRATE 50 UG/ML
INJECTION, SOLUTION INTRAMUSCULAR; INTRAVENOUS AS NEEDED
Status: DISCONTINUED | OUTPATIENT
Start: 2024-01-16 | End: 2024-01-16 | Stop reason: SURG

## 2024-01-16 RX ORDER — ONDANSETRON 4 MG/1
4 TABLET, ORALLY DISINTEGRATING ORAL EVERY 6 HOURS PRN
Status: DISCONTINUED | OUTPATIENT
Start: 2024-01-16 | End: 2024-01-18 | Stop reason: HOSPADM

## 2024-01-16 RX ORDER — BISACODYL 5 MG/1
5 TABLET, DELAYED RELEASE ORAL DAILY PRN
Status: DISCONTINUED | OUTPATIENT
Start: 2024-01-16 | End: 2024-01-18 | Stop reason: HOSPADM

## 2024-01-16 RX ORDER — SODIUM CHLORIDE, SODIUM LACTATE, POTASSIUM CHLORIDE, CALCIUM CHLORIDE 600; 310; 30; 20 MG/100ML; MG/100ML; MG/100ML; MG/100ML
9 INJECTION, SOLUTION INTRAVENOUS CONTINUOUS
Status: DISCONTINUED | OUTPATIENT
Start: 2024-01-16 | End: 2024-01-16

## 2024-01-16 RX ORDER — LIDOCAINE HYDROCHLORIDE 10 MG/ML
INJECTION, SOLUTION EPIDURAL; INFILTRATION; INTRACAUDAL; PERINEURAL AS NEEDED
Status: DISCONTINUED | OUTPATIENT
Start: 2024-01-16 | End: 2024-01-16 | Stop reason: SURG

## 2024-01-16 RX ORDER — PROPOFOL 10 MG/ML
VIAL (ML) INTRAVENOUS AS NEEDED
Status: DISCONTINUED | OUTPATIENT
Start: 2024-01-16 | End: 2024-01-16 | Stop reason: SURG

## 2024-01-16 RX ORDER — SODIUM CHLORIDE 0.9 % (FLUSH) 0.9 %
10 SYRINGE (ML) INJECTION AS NEEDED
Status: DISCONTINUED | OUTPATIENT
Start: 2024-01-16 | End: 2024-01-16 | Stop reason: HOSPADM

## 2024-01-16 RX ORDER — FENTANYL CITRATE 50 UG/ML
50 INJECTION, SOLUTION INTRAMUSCULAR; INTRAVENOUS
Status: DISCONTINUED | OUTPATIENT
Start: 2024-01-16 | End: 2024-01-16 | Stop reason: HOSPADM

## 2024-01-16 RX ORDER — SODIUM CHLORIDE 9 MG/ML
INJECTION, SOLUTION INTRAVENOUS AS NEEDED
Status: DISCONTINUED | OUTPATIENT
Start: 2024-01-16 | End: 2024-01-16 | Stop reason: HOSPADM

## 2024-01-16 RX ORDER — DOCUSATE SODIUM 100 MG/1
100 CAPSULE, LIQUID FILLED ORAL 2 TIMES DAILY PRN
Status: DISCONTINUED | OUTPATIENT
Start: 2024-01-16 | End: 2024-01-18 | Stop reason: HOSPADM

## 2024-01-16 RX ORDER — HYDROMORPHONE HYDROCHLORIDE 1 MG/ML
0.5 INJECTION, SOLUTION INTRAMUSCULAR; INTRAVENOUS; SUBCUTANEOUS
Status: DISCONTINUED | OUTPATIENT
Start: 2024-01-16 | End: 2024-01-16 | Stop reason: HOSPADM

## 2024-01-16 RX ORDER — MORPHINE SULFATE 2 MG/ML
1 INJECTION, SOLUTION INTRAMUSCULAR; INTRAVENOUS
Status: DISCONTINUED | OUTPATIENT
Start: 2024-01-16 | End: 2024-01-18

## 2024-01-16 RX ORDER — DROPERIDOL 2.5 MG/ML
1.25 INJECTION, SOLUTION INTRAMUSCULAR; INTRAVENOUS ONCE AS NEEDED
Status: DISCONTINUED | OUTPATIENT
Start: 2024-01-16 | End: 2024-01-16 | Stop reason: HOSPADM

## 2024-01-16 RX ORDER — ACETAMINOPHEN 325 MG/1
650 TABLET ORAL EVERY 4 HOURS PRN
Status: DISCONTINUED | OUTPATIENT
Start: 2024-01-16 | End: 2024-01-18 | Stop reason: HOSPADM

## 2024-01-16 RX ORDER — FENTANYL CITRATE 50 UG/ML
INJECTION, SOLUTION INTRAMUSCULAR; INTRAVENOUS
Status: COMPLETED
Start: 2024-01-16 | End: 2024-01-16

## 2024-01-16 RX ORDER — POLYETHYLENE GLYCOL 3350 17 G/17G
17 POWDER, FOR SOLUTION ORAL DAILY PRN
Status: DISCONTINUED | OUTPATIENT
Start: 2024-01-16 | End: 2024-01-18 | Stop reason: HOSPADM

## 2024-01-16 RX ORDER — KETOROLAC TROMETHAMINE 30 MG/ML
30 INJECTION, SOLUTION INTRAMUSCULAR; INTRAVENOUS EVERY 6 HOURS PRN
Status: DISCONTINUED | OUTPATIENT
Start: 2024-01-16 | End: 2024-01-18 | Stop reason: HOSPADM

## 2024-01-16 RX ORDER — FAMOTIDINE 20 MG/1
20 TABLET, FILM COATED ORAL
Status: COMPLETED | OUTPATIENT
Start: 2024-01-16 | End: 2024-01-16

## 2024-01-16 RX ORDER — NALOXONE HCL 0.4 MG/ML
0.4 VIAL (ML) INJECTION
Status: DISCONTINUED | OUTPATIENT
Start: 2024-01-16 | End: 2024-01-18

## 2024-01-16 RX ORDER — ONDANSETRON 2 MG/ML
4 INJECTION INTRAMUSCULAR; INTRAVENOUS ONCE AS NEEDED
Status: DISCONTINUED | OUTPATIENT
Start: 2024-01-16 | End: 2024-01-16 | Stop reason: HOSPADM

## 2024-01-16 RX ORDER — NALOXONE HCL 0.4 MG/ML
0.1 VIAL (ML) INJECTION
Status: DISCONTINUED | OUTPATIENT
Start: 2024-01-16 | End: 2024-01-18 | Stop reason: HOSPADM

## 2024-01-16 RX ORDER — HYDROMORPHONE HYDROCHLORIDE 1 MG/ML
0.5 INJECTION, SOLUTION INTRAMUSCULAR; INTRAVENOUS; SUBCUTANEOUS
Status: DISCONTINUED | OUTPATIENT
Start: 2024-01-16 | End: 2024-01-18 | Stop reason: HOSPADM

## 2024-01-16 RX ORDER — SODIUM CHLORIDE 0.9 % (FLUSH) 0.9 %
10 SYRINGE (ML) INJECTION EVERY 12 HOURS SCHEDULED
Status: DISCONTINUED | OUTPATIENT
Start: 2024-01-16 | End: 2024-01-18 | Stop reason: HOSPADM

## 2024-01-16 RX ORDER — LIDOCAINE HYDROCHLORIDE 10 MG/ML
0.5 INJECTION, SOLUTION EPIDURAL; INFILTRATION; INTRACAUDAL; PERINEURAL ONCE AS NEEDED
Status: DISCONTINUED | OUTPATIENT
Start: 2024-01-16 | End: 2024-01-16 | Stop reason: HOSPADM

## 2024-01-16 RX ORDER — SODIUM CHLORIDE 0.9 % (FLUSH) 0.9 %
10 SYRINGE (ML) INJECTION AS NEEDED
Status: DISCONTINUED | OUTPATIENT
Start: 2024-01-16 | End: 2024-01-18 | Stop reason: HOSPADM

## 2024-01-16 RX ORDER — ACETAMINOPHEN 650 MG/1
650 SUPPOSITORY RECTAL EVERY 4 HOURS PRN
Status: DISCONTINUED | OUTPATIENT
Start: 2024-01-16 | End: 2024-01-18 | Stop reason: HOSPADM

## 2024-01-16 RX ORDER — ROCURONIUM BROMIDE 10 MG/ML
INJECTION, SOLUTION INTRAVENOUS AS NEEDED
Status: DISCONTINUED | OUTPATIENT
Start: 2024-01-16 | End: 2024-01-16 | Stop reason: SURG

## 2024-01-16 RX ORDER — AMOXICILLIN 250 MG
2 CAPSULE ORAL 2 TIMES DAILY
Status: DISCONTINUED | OUTPATIENT
Start: 2024-01-16 | End: 2024-01-18 | Stop reason: HOSPADM

## 2024-01-16 RX ORDER — ONDANSETRON 2 MG/ML
INJECTION INTRAMUSCULAR; INTRAVENOUS AS NEEDED
Status: DISCONTINUED | OUTPATIENT
Start: 2024-01-16 | End: 2024-01-16 | Stop reason: SURG

## 2024-01-16 RX ORDER — SODIUM CHLORIDE, SODIUM LACTATE, POTASSIUM CHLORIDE, CALCIUM CHLORIDE 600; 310; 30; 20 MG/100ML; MG/100ML; MG/100ML; MG/100ML
100 INJECTION, SOLUTION INTRAVENOUS CONTINUOUS
Status: ACTIVE | OUTPATIENT
Start: 2024-01-16 | End: 2024-01-16

## 2024-01-16 RX ORDER — OXYCODONE HYDROCHLORIDE AND ACETAMINOPHEN 5; 325 MG/1; MG/1
1 TABLET ORAL EVERY 4 HOURS PRN
Status: DISCONTINUED | OUTPATIENT
Start: 2024-01-16 | End: 2024-01-18 | Stop reason: HOSPADM

## 2024-01-16 RX ADMIN — Medication 10 ML: at 22:07

## 2024-01-16 RX ADMIN — FENTANYL CITRATE 50 MCG: 50 INJECTION, SOLUTION INTRAMUSCULAR; INTRAVENOUS at 20:57

## 2024-01-16 RX ADMIN — Medication 100 MCG: at 20:05

## 2024-01-16 RX ADMIN — DEXAMETHASONE SODIUM PHOSPHATE 4 MG: 4 INJECTION, SOLUTION INTRAMUSCULAR; INTRAVENOUS at 19:29

## 2024-01-16 RX ADMIN — LIDOCAINE HYDROCHLORIDE 50 MG: 10 INJECTION, SOLUTION EPIDURAL; INFILTRATION; INTRACAUDAL; PERINEURAL at 19:25

## 2024-01-16 RX ADMIN — SODIUM CHLORIDE, POTASSIUM CHLORIDE, SODIUM LACTATE AND CALCIUM CHLORIDE 100 ML/HR: 600; 310; 30; 20 INJECTION, SOLUTION INTRAVENOUS at 13:41

## 2024-01-16 RX ADMIN — Medication 100 MCG: at 20:02

## 2024-01-16 RX ADMIN — ROCURONIUM BROMIDE 50 MG: 10 SOLUTION INTRAVENOUS at 19:25

## 2024-01-16 RX ADMIN — ONDANSETRON 4 MG: 2 INJECTION INTRAMUSCULAR; INTRAVENOUS at 20:27

## 2024-01-16 RX ADMIN — SODIUM CHLORIDE 2 G: 900 INJECTION INTRAVENOUS at 19:30

## 2024-01-16 RX ADMIN — HYDROMORPHONE HYDROCHLORIDE 0.5 MG: 1 INJECTION, SOLUTION INTRAMUSCULAR; INTRAVENOUS; SUBCUTANEOUS at 20:48

## 2024-01-16 RX ADMIN — SENNOSIDES AND DOCUSATE SODIUM 2 TABLET: 8.6; 5 TABLET ORAL at 09:21

## 2024-01-16 RX ADMIN — Medication 100 MCG: at 19:48

## 2024-01-16 RX ADMIN — SODIUM CHLORIDE, POTASSIUM CHLORIDE, SODIUM LACTATE AND CALCIUM CHLORIDE: 600; 310; 30; 20 INJECTION, SOLUTION INTRAVENOUS at 19:22

## 2024-01-16 RX ADMIN — HYDROMORPHONE HYDROCHLORIDE 0.5 MG: 1 INJECTION, SOLUTION INTRAMUSCULAR; INTRAVENOUS; SUBCUTANEOUS at 21:23

## 2024-01-16 RX ADMIN — FAMOTIDINE 20 MG: 10 INJECTION INTRAVENOUS at 17:39

## 2024-01-16 RX ADMIN — FENTANYL CITRATE 50 MCG: 50 INJECTION, SOLUTION INTRAMUSCULAR; INTRAVENOUS at 21:12

## 2024-01-16 RX ADMIN — HYDROMORPHONE HYDROCHLORIDE 0.5 MG: 1 INJECTION, SOLUTION INTRAMUSCULAR; INTRAVENOUS; SUBCUTANEOUS at 23:41

## 2024-01-16 RX ADMIN — PROPOFOL 200 MG: 10 INJECTION, EMULSION INTRAVENOUS at 19:25

## 2024-01-16 RX ADMIN — MORPHINE SULFATE 1 MG: 2 INJECTION, SOLUTION INTRAMUSCULAR; INTRAVENOUS at 22:01

## 2024-01-16 RX ADMIN — ROCURONIUM BROMIDE 10 MG: 10 SOLUTION INTRAVENOUS at 20:07

## 2024-01-16 RX ADMIN — SODIUM CHLORIDE, POTASSIUM CHLORIDE, SODIUM LACTATE AND CALCIUM CHLORIDE 9 ML/HR: 600; 310; 30; 20 INJECTION, SOLUTION INTRAVENOUS at 17:40

## 2024-01-16 RX ADMIN — SODIUM CHLORIDE, POTASSIUM CHLORIDE, SODIUM LACTATE AND CALCIUM CHLORIDE 100 ML/HR: 600; 310; 30; 20 INJECTION, SOLUTION INTRAVENOUS at 03:15

## 2024-01-16 RX ADMIN — MORPHINE SULFATE 1 MG: 2 INJECTION, SOLUTION INTRAMUSCULAR; INTRAVENOUS at 13:41

## 2024-01-16 RX ADMIN — FENTANYL CITRATE 100 MCG: 50 INJECTION, SOLUTION INTRAMUSCULAR; INTRAVENOUS at 19:25

## 2024-01-16 RX ADMIN — SUGAMMADEX 300 MG: 100 INJECTION, SOLUTION INTRAVENOUS at 20:27

## 2024-01-16 NOTE — CONSULTS
[facility]    Referring Provider: Jeremy Murphy MD     PCP: Shaina Brennan APRN    Reason for Consultation: Elevated LFTs    Chief complaint: Abdominal pain     History of present illness:    Umu Queen is a very pleasant 33 y.o. female, 10 weeks postpartum, who is admitted with right upper quadrant pain.   She describes acute, sharp, severe 10/10 right upper quadrant pain that began after a meal on Saturday, 1/13/24.   This spontaneously resolved after several hours at home.   She however has since had recurrent post prandial pain of lesser severity.   She has associated nausea and vomiting.   She denies any fever or chills.    She began to notice scleral icterus yesterday and thus presented to the ER.      She is feeling improved overnight.      Allergies:  Patient has no known allergies.    Scheduled Meds:  senna-docusate sodium, 2 tablet, Oral, BID  sodium chloride, 10 mL, Intravenous, Q12H         Infusions:  lactated ringers, 100 mL/hr, Last Rate: 100 mL/hr (01/16/24 0830)        PRN Meds:    senna-docusate sodium **AND** polyethylene glycol **AND** bisacodyl **AND** bisacodyl    Morphine **AND** naloxone    ondansetron    sodium chloride    sodium chloride    sodium chloride    Home Meds:  Medications Prior to Admission   Medication Sig Dispense Refill Last Dose    docusate sodium 100 MG capsule Take 1 capsule by mouth 2 (Two) Times a Day As Needed for Constipation. 60 capsule 0     ibuprofen (ADVIL,MOTRIN) 600 MG tablet Take 1 tablet by mouth Every 6 (Six) Hours. 60 tablet 0     Prenatal Vit-Fe Fumarate-FA (Prenatal 27-1) 27-1 MG tablet tablet Take  by mouth Daily.          ROS: Review of Systems   Constitutional:  Negative for chills and fever.   HENT: Negative.     Eyes: Negative.    Respiratory: Negative.     Cardiovascular: Negative.    Gastrointestinal:  Positive for abdominal pain, nausea and vomiting.   Endocrine: Negative.    Genitourinary: Negative.   "  Musculoskeletal: Negative.    Skin:  Positive for color change.   Allergic/Immunologic: Negative.    Neurological: Negative.    Hematological: Negative.    Psychiatric/Behavioral: Negative.       PAST MED HX:  Past Medical History:   Diagnosis Date    Anemia     Anesthesia complication     difficult to wake up    Anxiety     Depression     Gestational hypertension     1st pregnancy    Ovarian cyst     right    PONV (postoperative nausea and vomiting)     Seizures     as a child, hasnt had any seizures in 10 years.     PAST SURG HX:  Past Surgical History:   Procedure Laterality Date     SECTION       SECTION N/A 10/27/2023    Procedure:  SECTION REPEAT;  Surgeon: Emma Barrera MD;  Location: [facility] LABOR DELIVERY;  Service: Obstetrics;  Laterality: N/A;    HEMORRHOIDECTOMY       FAM HX:  History reviewed. No pertinent family history.    SOC HX:  Social History     Socioeconomic History    Marital status:    Tobacco Use    Smoking status: Never     Passive exposure: Never    Smokeless tobacco: Never   Vaping Use    Vaping Use: Never used   Substance and Sexual Activity    Alcohol use: Not Currently    Drug use: Never    Sexual activity: Defer     PHYSICAL EXAM  /81 (BP Location: Right arm, Patient Position: Lying)   Pulse 66   Temp 98.1 °F (36.7 °C) (Oral)   Resp 18   Ht 162.6 cm (64\")   Wt 113 kg (249 lb)   SpO2 91%   Breastfeeding Yes   BMI 42.74 kg/m²   Wt Readings from Last 3 Encounters:   24 113 kg (249 lb)   10/27/23 122 kg (269 lb)   10/24/23 122 kg (269 lb 11.7 oz)   ,body mass index is 42.74 kg/m².  Physical Exam  Constitutional:       General: She is not in acute distress.     Appearance: She is obese.   HENT:      Head: Normocephalic and atraumatic.      Mouth/Throat:      Mouth: Mucous membranes are moist.   Eyes:      General: No scleral icterus.  Cardiovascular:      Rate and Rhythm: Normal rate and regular rhythm.   Pulmonary:      Effort: " "Pulmonary effort is normal.   Abdominal:      General: Bowel sounds are normal.      Palpations: Abdomen is soft.      Tenderness: There is abdominal tenderness in the right upper quadrant. There is no guarding or rebound.   Musculoskeletal:      Right lower leg: No edema.      Left lower leg: No edema.   Skin:     General: Skin is warm and dry.   Neurological:      Mental Status: She is alert and oriented to person, place, and time.       Results Review:   I reviewed the patient's new clinical results.    Lab Results   Component Value Date    WBC 6.50 01/16/2024    HGB 11.9 (L) 01/16/2024    HGB 13.1 01/15/2024    HGB 10.2 (L) 10/28/2023    HCT 36.7 01/16/2024    MCV 90.0 01/16/2024     01/16/2024     No results found for: \"INR\"    Lab Results   Component Value Date    GLUCOSE 89 01/16/2024    BUN 7 01/16/2024    CREATININE 0.61 01/16/2024    BCR 11.5 01/16/2024     01/16/2024    K 3.6 01/16/2024    CO2 22.0 01/16/2024    CALCIUM 8.4 (L) 01/16/2024    ALBUMIN 3.6 01/16/2024    ALKPHOS 237 (H) 01/16/2024    BILITOT 4.1 (H) 01/16/2024    BILIDIR 3.9 (H) 01/16/2024     (H) 01/16/2024     (H) 01/16/2024     CT abdomen/pelvis (as interpreted by radiologist)  Findings:   Lung Bases:  The visualized lung bases and lower mediastinal structures are unremarkable.   Peritoneum:  No free intraperitoneal air or fluid.    Abdominal wall:  Unremarkable.   Liver:  Liver is normal in size and contour. No focal lesions.   Biliary/Gallbladder:  The gallbladder is distended. Thin gallbladder wall calcifications as well as small gallstones are visualized. No pericolonic inflammatory changes are visualized. There is no intrahepatic biliary ductal dilatation. There is a distal CBD radiopaque   calculus measuring 1.7 cm in craniocaudal length and 0.8 cm in width. There is dilatation of the CBD measuring 1 cm in width.   Pancreas:  Pancreas is within normal limits. There is no evidence of pancreatic mass or " peripancreatic fluid.   Spleen:  Spleen is normal in size and contour.   Gastrointestinal/Mesentery:   No evidence of bowel obstruction or gross inflammatory changes.The appendix appears within normal limits.  No mesenteric fluid collections identified.   Adrenals:  Adrenal glands are unremarkable.   Kidneys:  The kidneys are in anatomic position. No evidence of nephrolithiasis. No evidence of hydronephrosis or perinephric fat stranding.   Bladder:   The urinary bladder is unremarkable.   Reproductive organs:    Right ovarian lesion measures 3.1 cm containing fat and calcium consistent with dermoid. The uterus and left ovary are unremarkable.   Retroperitoneal/Lymph Nodes:  No retroperitoneal adenopathy is identified.    Vasculature:  Unremarkable.   Bony Structures:    No acute fracture or aggressive lesions.   IMPRESSION:  Impression:   Cholelithiasis. There is also evidence of gallbladder wall calcifications compatible with porcelain gallbladder. The gallbladder is distended. Distal CBD calculus measures 1.7 x 0.8 cm. There is secondary dilatation of the CBD measuring 1 cm. There is no   intrahepatic biliary ductal dilatation.   3.1 cm right ovarian dermoid. Consider annual surveillance to ensure stability in size.       Gallbladder ultrasound:  Findings:  The liver is homogeneous in echotexture and normal in echogenicity. No focal liver masses are identified. The hepatic and portal veins are patent with normal directional flow. There are multiple tiny mobile shadowing gallstones in the dependent portions   of the gallbladder. Better seen on the CT The nondependent portion of the gallbladder are calcifications indicative of porcelain gallbladder. No pericholecystic fluid or definitive sludge. The gallbladder wall is 3 mm in thickness. No intrahepatic biliary dilatation is seen although there is distention of the common duct to 1.0 cm.   The calcification in the distal common duct clearly seen on the CT from  earlier today is likely obscured by bowel gas and not definitively seen on the ultrasound. The pancreas is obscured by bowel gas. The right kidney is 11.2 cm in [id number]   length.Cortical thickness and echogenicity are preserved.No hydronephrosis or suspicious renal masses. No definitive shadowing stones.   No fluid collections are seen in the right upper quadrant.The intrahepatic portions of the IVC are patent. The abdominal aorta is obscured by bowel gas.   IMPRESSION:  Impression:  1.Cholelithiasis with porcelain gallbladder. The common duct is dilated to 1 cm. The calcification in the distal common duct clearly seen on the CT from earlier today is likely obscured by bowel gas and not definitively seen on the submitted sonographic   images.  2.The pancreas and abdominal aorta are obscured by bowel gas.    MRCP: (as interpreted by radiologist)  MR Abdomen:  The liver size and contour are normal without evidence of fibrosis.  No hepatic mass or steatosis.   There is conventional and patent hepatic arterial anatomy.  Patent portal and hepatic veins.  The SMV and splenic veins are patent.  No ascites or   intraabdominal fluid collection.    The pancreas enhances homogeneously. No peripancreatic fluid collection, pancreatic necrosis, or pancreatic pseudocyst.  The spleen is normal.  The adrenal glands are of normal size and morphology. The kidneys demonstrate no parenchymal mass or   hydronephrosis. No evidence of dilated bowel or obstruction. No abnormal areas of contrast enhancement.   The abdominal aorta is normal in caliber without aneurysmal dilatation. The celiac, splenic, and superior mesenteric arteries are patent.  No lymphadenopathy.     MRCP:   There are several stones within the gallbladder. There is no evidence of pericholecystic fluid or gallbladder wall thickening. The intra and extrahepatic biliary tree appears unremarkable.  The common bile duct measures up to mm. No filling defects   within the  common bile duct to suggest choledocholithiasis.  The pancreatic duct is of normal diameter without evidence of dilation or stricture.  No evidence of pancreatic divisum.     IMPRESSION:  Cholelithiasis. No convincing choledocholithiasis. No inflammatory change to indicate pancreatitis or acute cholecystitis.    ASSESSMENTS/PLANS    Post prandial right upper quadrant pain, improving.    Cholestatic hepatitis, suspect passed a stone.  Improving  Cholelithiasis   Post partum state     I have personally reviewed her CT, ultrasound and MRCP images.   Initial CT ductal dilatation and a stone in the distal common bile duct.  However, subsequent MRCP shows a 5 mm common bile duct without clear filling defect.   Her symptoms are improved today and suggest she has passed a stone.        >> Will consult surgery for cholecystectomy with [facility]  >> Obtain CMP in the AM     I discussed the patient's findings and my recommendations with patient    JERSEY Rodrigez  01/16/24  11:34 EST

## 2024-01-16 NOTE — CASE MANAGEMENT/SOCIAL WORK
Discharge Planning Assessment  [facility]     Patient Name: Umu Queen  MRN: [id number]  Today's Date: 1/16/2024    Admit Date: 1/15/2024    Plan: IDP; home with family   Discharge Needs Assessment       Row Name 01/16/24 1000       Living Environment    People in Home spouse;child(alina), dependent;parent(s)    Name(s) of People in Home anthony queen (Spouse)  [phone] (Home Phone)    Current Living Arrangements home    Potentially Unsafe Housing Conditions none    In the past 12 months has the electric, gas, oil, or water company threatened to shut off services in your home? No    Primary Care Provided by self    Provides Primary Care For child(alina)    Family Caregiver if Needed spouse    Family Caregiver Names anthony queen (Spouse)  [phone] (Home Phone)    Quality of Family Relationships helpful;involved    Able to Return to Prior Arrangements yes       Resource/Environmental Concerns    Resource/Environmental Concerns none    Transportation Concerns none       Transportation Needs    In the past 12 months, has lack of transportation kept you from medical appointments or from getting medications? no    In the past 12 months, has lack of transportation kept you from meetings, work, or from getting things needed for daily living? No       Food Insecurity    Within the past 12 months, you worried that your food would run out before you got the money to buy more. Never true    Within the past 12 months, the food you bought just didn't last and you didn't have money to get more. Never true       Transition Planning    Patient/Family Anticipates Transition to home with family    Patient/Family Anticipated Services at Transition none    Transportation Anticipated car, drives self;family or friend will provide       Discharge Needs Assessment    Readmission Within the Last 30 Days no previous admission in last 30 days    Equipment Currently Used at Home none    Concerns to be Addressed  denies needs/concerns at this time    Anticipated Changes Related to Illness none    Equipment Needed After Discharge none                   Discharge Plan       Row Name 01/16/24 1001       Plan    Plan IDP; home with family    Patient/Family in Agreement with Plan yes    Plan Comments CM met with the patient at the bedside. She lives in [place] in a house with her , father in law, and two minor children. 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 She denied having medical equipment at home. She confirmed that she does not have any healt insurance as her spouse is in[employer] jobs at the moment. She does have a PCP, Shaina Brennan APRN. At this time, the patient plans to return home at discharge with her spouse to transport when she is medically ready. CM to follow and assist as needed.    Final Discharge Disposition Code 01 - home or self-care                  Continued Care and Services - Admitted Since 1/15/2024    Coordination has not been started for this encounter.          Demographic Summary    No documentation.                  Functional Status       Row Name 01/16/24 1000       Functional Status    Usual Activity Tolerance good    Current Activity Tolerance good       Physical Activity    On average, how many days per week do you engage in moderate to strenuous exercise (like a brisk walk)? 0 days    On average, how many minutes do you engage in exercise at this level? 0 min    Number of minutes of exercise per week 0       Assessment of Health Literacy    How often do you have someone help you read hospital materials? Never    How often do you have problems learning about your medical condition because of difficulty understanding written information? Never    How often do you have a problem understanding what is told to you about your medical condition? Never    How confident are you filling out medical forms by yourself? Extremely    Health Literacy  Excellent       Functional Status, IADL    Medications independent    Meal Preparation independent    Housekeeping independent    Laundry independent    Shopping independent       Mental Status    General Appearance WDL WDL       Mental Status Summary    Recent Changes in Mental Status/Cognitive Functioning no changes       Employment/    Employment Status homemaker                   Psychosocial    No documentation.                  Abuse/Neglect    No documentation.                  Legal    No documentation.                  Substance Abuse    No documentation.                  Patient Forms    No documentation.                     Yakelin Lopez RN

## 2024-01-16 NOTE — CONSULTS
Patient Name:  Umu Queen  YOB: 1990  [id number]       Patient Care Team:  Shaina Brennan APRN as PCP - General (Family Medicine)      General Surgery Consult Note     Date of Consultation: 24    Referring Physician - Jeremy Murphy MD    Reason for Consult -cholelithiasis    Subjective     I have been asked to see  Umu Queen , a 33 y.o. female in consultation for cholelithiasis from Dr. Murphy/Brunner.  The patient had acute onsets of right upper quadrant abdominal pain starting on 2024 after eating a meal.  The pain lasted for several hours but then improved.  After eating again on 1/15/2024 for the pain recurred and was again severe.  The patient then presented to our emergency room for further evaluation.  Pain was associated with nausea and vomiting but no fevers or chills.      Allergy: No Known Allergies    Medications:  senna-docusate sodium, 2 tablet, Oral, BID  sodium chloride, 10 mL, Intravenous, Q12H      lactated ringers, 100 mL/hr, Last Rate: 100 mL/hr (24 0830)      No current facility-administered medications on file prior to encounter.     Current Outpatient Medications on File Prior to Encounter   Medication Sig    docusate sodium 100 MG capsule Take 1 capsule by mouth 2 (Two) Times a Day As Needed for Constipation.    ibuprofen (ADVIL,MOTRIN) 600 MG tablet Take 1 tablet by mouth Every 6 (Six) Hours.    Prenatal Vit-Fe Fumarate-FA (Prenatal 27) 27-1 MG tablet tablet Take  by mouth Daily.       PMHx:   Past Medical History:   Diagnosis Date    Anemia     Anesthesia complication     difficult to wake up    Anxiety     Depression     Gestational hypertension     1st pregnancy    Ovarian cyst     right    PONV (postoperative nausea and vomiting)     Seizures     as a child, hasnt had any seizures in 10 years.       Past Surgical History:  Past Surgical History:   Procedure Laterality Date     SECTION       SECTION N/A  "10/27/2023    Procedure:  SECTION REPEAT;  Surgeon: Emma Barrera MD;  Location: [facility] LABOR DELIVERY;  Service: Obstetrics;  Laterality: N/A;    HEMORRHOIDECTOMY          Family History:   History reviewed. No pertinent family history.     Social History:   Social History     Socioeconomic History    Marital status:    Tobacco Use    Smoking status: Never     Passive exposure: Never    Smokeless tobacco: Never   Vaping Use    Vaping Use: Never used   Substance and Sexual Activity    Alcohol use: Not Currently    Drug use: Never    Sexual activity: Defer         Review of Systems     Constitutional: No fevers, chills or malaise   Eyes: Denies visual changes    Cardiovascular: Denies chest pain, palpitations   Pulmonary: Denies cough or shortness of breath   Abdominal/ GI: See HPI    Genitourinary: Denies dysuria or hematuria   Musculoskeletal: Denies any but chronic joint aches, pains or deformities   Psychiatric: No recent mood changes   Neurologic: No paresthesias or loss of function          Objective     Physical Exam:      Vital Signs  /81 (BP Location: Right arm, Patient Position: Lying)   Pulse 66   Temp 98.1 °F (36.7 °C) (Oral)   Resp 18   Ht 162.6 cm (64\")   Wt 113 kg (249 lb)   SpO2 91%   Breastfeeding Yes   BMI 42.74 kg/m²     Intake/Output Summary (Last 24 hours) at 2024 1238  Last data filed at 1/15/2024 2302  Gross per 24 hour   Intake 1000 ml   Output --   Net 1000 ml         Physical Exam:    Head: Normocephalic, atraumatic.   Eyes: Pupils equal, round, react to light and accommodation.   Mouth: Oral mucosa without lesions  Neck: No masses, lymphadenopathy or carotid bruits bilaterally  CV: Rhythm and rate regular, no murmurs, rubs or gallops  Lungs: Clear to auscultation bilaterally  Abdomen: Bowel sounds positive, soft, nontender, nondistended  Groin : No obvious hernias bilaterally  Extremities:  No cyanosis, clubbing or edema bilaterally  Lymphatics: No " abnormal lymphadenopathy appreciated  Neurologic: No gross deficits      Results Review: I have personally reviewed all of the recent lab and imaging results available at this time.   WBC 6.5  Hemoglobin 11.9  Creatinine 0.61  Alk phos 237      Total bilirubin 4.1    CT abdomen pelvis 1/15/2024:  Cholelithiasis. There is also evidence of gallbladder wall calcifications compatible with porcelain gallbladder. The gallbladder is distended. Distal CBD calculus measures 1.7 x 0.8 cm. There is secondary dilatation of the CBD measuring 1 cm. There is no   intrahepatic biliary ductal dilatation.    Gallbladder ultrasound 1/15/2024:  Impression:  1.Cholelithiasis with porcelain gallbladder. The common duct is dilated to 1 cm. The calcification in the distal common duct clearly seen on the CT from earlier today is likely obscured by bowel gas and not definitively seen on the submitted sonographic   images.  2.The pancreas and abdominal aorta are obscured by bowel gas.          Assessment & Plan     Assessment and Plan:    Patient with likely recent choledocholithiasis with dilated common bile duct seen on CT and ultrasound.  LFTs improved this morning so possible that she had passed this common bile duct stone.  Questionable porcelain gallbladder seen on imaging.  We discussed the risk, benefits, and alternatives to laparoscopic cholecystectomy and the patient was agreeable.  Will plan for surgery this evening.  N.p.o., IV fluids, pain control until the time of operation.      I discussed the patient's findings and my recommendations with the patient and/or family, as well as the primary team     Delgado Guevara MD  01/16/24  12:38 EST

## 2024-01-16 NOTE — PROGRESS NOTES
[facility] Medicine Services  PROGRESS NOTE    Patient Name: Umu Queen  : 1990  MRN: [id number]    Date of Admission: 1/15/2024  Primary Care Physician: Shaina Brennan APRN    Subjective   Subjective     CC:  Abdominal pain    HPI:  Feels ok.  Denies current abdominal pain, states that pain is postprandial.        Objective   Objective     Vital Signs:   Temp:  [97.6 °F (36.4 °C)-98.1 °F (36.7 °C)] 98.1 °F (36.7 °C)  Heart Rate:  [] 66  Resp:  [16-18] 18  BP: (126-154)/() 126/81     Physical Exam:  Constitutional: No acute distress, awake, alert  HENT: NCAT, mucous membranes moist  Respiratory: Clear to auscultation bilaterally, respiratory effort normal   Cardiovascular: RRR, no murmurs, rubs, or gallops  Gastrointestinal: Positive bowel sounds, soft, nontender, nondistended  Musculoskeletal: No bilateral ankle edema  Psychiatric: Appropriate affect, cooperative  Neurologic: Oriented x 3, strength symmetric in all extremities, Cranial Nerves grossly intact to confrontation, speech clear  Skin: No rashes      Results Reviewed:  LAB RESULTS:      Lab 24  0427 01/15/24  1856   WBC 6.50 6.42   HEMOGLOBIN 11.9* 13.1   HEMATOCRIT 36.7 40.0   PLATELETS 311 343   NEUTROS ABS 3.39 3.72   IMMATURE GRANS (ABS) 0.02 0.02   LYMPHS ABS 2.47 2.13   MONOS ABS 0.44 0.38   EOS ABS 0.17 0.15   MCV 90.0 90.1         Lab 24  0427 01/15/24  1833   SODIUM 144 136   POTASSIUM 3.6 3.9   CHLORIDE 109* 101   CO2 22.0 19.0*   ANION GAP 13.0 16.0*   BUN 7 8   CREATININE 0.61 0.64   EGFR 122.8 121.3   GLUCOSE 89 92   CALCIUM 8.4* 9.0         Lab 24  0427 01/15/24  1833   TOTAL PROTEIN 6.3 7.8   ALBUMIN 3.6 4.3   GLOBULIN  --  3.5   ALT (SGPT) 528* 763*   AST (SGOT) 164* 305*   BILIRUBIN 4.1* 5.5*   INDIRECT BILIRUBIN 0.2  --    BILIRUBIN DIRECT 3.9*  --    ALK PHOS 237* 295*   LIPASE  --  37         Lab 01/15/24  1833   PROBNP 39.7   HSTROP T <6                  Brief Urine Lab Results  (Last result in the past 365 days)        Color   Clarity   Blood   Leuk Est   Nitrite   Protein   CREAT   Urine HCG        01/15/24 2015               Negative               Microbiology Results Abnormal       None            MRI abdomen wo contrast mrcp    Result Date: 1/16/2024  MRI ABDOMEN WO CONTRAST MRCP Date of Exam: 1/16/2024 12:43 AM EST Indication: Dilated CBD, jaundice, elevated LFTs.  Comparison: None available. Technique:  Routine multiplanar/multisequence images of the abdomen were obtained with MRCP sequences without contrast administration. Findings: MR Abdomen: The liver size and contour are normal without evidence of fibrosis.  No hepatic mass or steatosis.   There is conventional and patent hepatic arterial anatomy.  Patent portal and hepatic veins.  The SMV and splenic veins are patent.  No ascites or intraabdominal fluid collection. The pancreas enhances homogeneously. No peripancreatic fluid collection, pancreatic necrosis, or pancreatic pseudocyst.  The spleen is normal.  The adrenal glands are of normal size and morphology. The kidneys demonstrate no parenchymal mass or hydronephrosis. No evidence of dilated bowel or obstruction. No abnormal areas of contrast enhancement. The abdominal aorta is normal in caliber without aneurysmal dilatation. The celiac, splenic, and superior mesenteric arteries are patent.  No lymphadenopathy. MRCP: There are several stones within the gallbladder. There is no evidence of pericholecystic fluid or gallbladder wall thickening. The intra and extrahepatic biliary tree appears unremarkable.  The common bile duct measures up to mm. No filling defects within the common bile duct to suggest choledocholithiasis.  The pancreatic duct is of normal diameter without evidence of dilation or stricture.  No evidence of pancreatic divisum.     Impression: Cholelithiasis. No convincing choledocholithiasis. No inflammatory change to indicate  pancreatitis or acute cholecystitis. Electronically Signed: Wilmar Porras MD  1/16/2024 1:15 AM EST  Workstation ID: [id number]    US Gallbladder    Result Date: 1/15/2024  US GALLBLADDER Date of Exam: 1/15/2024 9:15 PM EST Indication: RUQ pain, jaundice. Comparison: CT of the abdomen and pelvis with contrast from 1/15/2024 Technique: Grayscale and color Doppler ultrasound evaluation of the right upper quadrant was performed. Findings: The liver is homogeneous in echotexture and normal in echogenicity. No focal liver masses are identified. The hepatic and portal veins are patent with normal directional flow. There are multiple tiny mobile shadowing gallstones in the dependent portions of the gallbladder. Better seen on the CT The nondependent portion of the gallbladder are calcifications indicative of porcelain gallbladder. No pericholecystic fluid or definitive sludge. The gallbladder wall is 3 mm in thickness. No intrahepatic biliary dilatation is seen although there is distention of the common duct to 1.0 cm. The calcification in the distal common duct clearly seen on the CT from earlier today is likely obscured by bowel gas and not definitively seen on the ultrasound. The pancreas is obscured by bowel gas. The right kidney is 11.2 cm in pjiz-we-jpxp length.Cortical thickness and echogenicity are preserved.No hydronephrosis or suspicious renal masses. No definitive shadowing stones. No fluid collections are seen in the right upper quadrant.The intrahepatic portions of the IVC are patent. The abdominal aorta is obscured by bowel gas.     Impression: Impression: 1.Cholelithiasis with porcelain gallbladder. The common duct is dilated to 1 cm. The calcification in the distal common duct clearly seen on the CT from earlier today is likely obscured by bowel gas and not definitively seen on the submitted sonographic images. 2.The pancreas and abdominal aorta are obscured by bowel gas. Electronically Signed: Jreemy Lui  DO  1/15/2024 9:51 PM EST  Workstation ID: [id number]    CT Abdomen Pelvis With Contrast    Result Date: 1/15/2024  CT ABDOMEN PELVIS W CONTRAST Date of Exam: 1/15/2024 8:43 PM EST Indication: Abdominal pain, acute, nonlocalized RUQ pain, jaundice. Comparison: None available. Technique: Axial CT images were obtained of the abdomen and pelvis following the uneventful intravenous administration of Isovue-300, 85 mL. Reconstructed coronal and sagittal images were also obtained. Automated exposure control and iterative construction methods were used. Findings: Lung Bases: The visualized lung bases and lower mediastinal structures are unremarkable. Peritoneum: No free intraperitoneal air or fluid. Abdominal wall: Unremarkable. Liver: Liver is normal in size and contour. No focal lesions. Biliary/Gallbladder: The gallbladder is distended. Thin gallbladder wall calcifications as well as small gallstones are visualized. No pericolonic inflammatory changes are visualized. There is no intrahepatic biliary ductal dilatation. There is a distal CBD radiopaque calculus measuring 1.7 cm in craniocaudal length and 0.8 cm in width. There is dilatation of the CBD measuring 1 cm in width. Pancreas: Pancreas is within normal limits. There is no evidence of pancreatic mass or peripancreatic fluid. Spleen: Spleen is normal in size and contour. Gastrointestinal/Mesentery: No evidence of bowel obstruction or gross inflammatory changes.The appendix appears within normal limits.  No mesenteric fluid collections identified. Adrenals: Adrenal glands are unremarkable. Kidneys: The kidneys are in anatomic position. No evidence of nephrolithiasis. No evidence of hydronephrosis or perinephric fat stranding. Bladder: The urinary bladder is unremarkable. Reproductive organs:  Right ovarian lesion measures 3.1 cm containing fat and calcium consistent with dermoid. The uterus and left ovary are unremarkable. Retroperitoneal/Lymph Nodes: No  retroperitoneal adenopathy is identified. Vasculature: Unremarkable.   Bony Structures:  No acute fracture or aggressive lesions.     Impression: Impression: Cholelithiasis. There is also evidence of gallbladder wall calcifications compatible with porcelain gallbladder. The gallbladder is distended. Distal CBD calculus measures 1.7 x 0.8 cm. There is secondary dilatation of the CBD measuring 1 cm. There is no  intrahepatic biliary ductal dilatation. 3.1 cm right ovarian dermoid. Consider annual surveillance to ensure stability in size. Electronically Signed: Izaiah Gustafson MD  1/15/2024 9:01 PM EST  Workstation ID: [id number]    XR Chest 1 View    Result Date: 1/15/2024  XR CHEST 1 VW Date of Exam: 1/15/2024 5:45 PM EST Indication: SOA triage protocol Comparison: None available. Findings: There are no consolidations.No pleural effusion. There is no evidence of pneumothorax. The pulmonary vasculature appears within normal limits. The cardiac and mediastinal silhouette appear unremarkable. No acute osseous abnormality identified.     Impression: Impression: No radiographic evidence of acute chest process. Electronically Signed: Izaiah Gustafson MD  1/15/2024 6:05 PM EST  Workstation ID: [id number]         Current medications:  Scheduled Meds:senna-docusate sodium, 2 tablet, Oral, BID  sodium chloride, 10 mL, Intravenous, Q12H      Continuous Infusions:lactated ringers, 100 mL/hr, Last Rate: 100 mL/hr (01/16/24 1341)      PRN Meds:.  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        Brief Hospital Course to  date:  Umu Queen is a 33 y.o. female with history of anemia, anxiety, depression, ovarian cyst, presents to the ED with complaints of right upper quadrant abdominal pain.       Assessment and plan:     Cholelithiasis with porcelain gallbladder  CBD dilation  Elevated LFTs  -- Ultrasound gallbladder shows cholelithiasis with porcelain gallbladder.  The common duct is dilated to 1 cm.  The pancreas and abdominal aorta are obscured by bowel gas  --CT abdomen pelvis shows cholelithiasis with evidence of gallbladder wall calcifications compatible with porcelain gallbladder.  The gallbladder is distended.  Distal CBD calculus measures 1.7 x 0.8 cm.  Secondary to dilatation of the CBD measuring 1 cm.  -- MRCP shows cholelithiasis  -- seen by GI who ? If already passed stone, recs consult surgery for mu  -- seen by surgery who plans mu     Ovarian dermoid  -- CT abdomen pelvis shows a 3.1 cm right ovarian dermoid  -- Annual surveillance to ensure stability in size        Expected Discharge Location and Transportation:   Expected Discharge   Expected Discharge Date: 1/17/2024; Expected Discharge Time:      DVT prophylaxis:  Mechanical DVT prophylaxis orders are present.     AM-PAC 6 Clicks Score (PT): 24 (01/16/24 0800)    CODE STATUS:   Code Status and Medical Interventions:   Ordered at: 01/15/24 6363     Level Of Support Discussed With:    Patient     Code Status (Patient has no pulse and is not breathing):    CPR (Attempt to Resuscitate)     Medical Interventions (Patient has pulse or is breathing):    Full Support       Jeremy Murphy MD  01/16/24

## 2024-01-16 NOTE — ED NOTES
Umu Queen    Nursing Report ED to Floor:  Mental status: A/Ox4  Ambulatory status: Standby  Oxygen Therapy:  RA  Cardiac Rhythm: NSR  Admitted from: ED - Home   Safety Concerns:  NA  Social Issues: NA  ED Room #:  22    ED Nurse Phone Extension - [phone] or may call [phone].      HPI:   Chief Complaint   Patient presents with    Abdominal Pain    Shortness of Breath       Past Medical History:  Past Medical History:   Diagnosis Date    Anemia     Anesthesia complication     difficult to wake up    Anxiety     Depression     Gestational hypertension     1st pregnancy    Ovarian cyst     right    PONV (postoperative nausea and vomiting)     Seizures     as a child, hasnt had any seizures in 10 years.        Past Surgical History:  Past Surgical History:   Procedure Laterality Date     SECTION       SECTION N/A 10/27/2023    Procedure:  SECTION REPEAT;  Surgeon: Emma Barrera MD;  Location: [facility] LABOR DELIVERY;  Service: Obstetrics;  Laterality: N/A;    HEMORRHOIDECTOMY          Admitting Doctor:   Felipe Zepeda DO    Consulting Provider(s):  Consults       Date and Time Order Name Status Description    1/15/2024 10:45 PM Inpatient Gastroenterology Consult               Admitting Diagnosis:   The primary encounter diagnosis was Biliary obstruction. A diagnosis of Porcelain gallbladder was also pertinent to this visit.    Most Recent Vitals:   Vitals:    24 0046 24 0100 24 0114 24 0130   BP: 137/82 139/96 (!) 137/113 147/87   Pulse: 70 73 71 71   Resp:       Temp:       TempSrc:       SpO2: 97% 96% 97% 94%   Weight:       Height:           Active LDAs/IV Access:   Lines, Drains & Airways       Active LDAs       Name Placement date Placement time Site Days    Peripheral IV 01/15/24 1856 Anterior;Left Forearm 01/15/24  1856  Forearm  less than 1                    Labs (abnormal labs have a star):   Labs Reviewed   COMPREHENSIVE METABOLIC PANEL - Abnormal;  Notable for the following components:       Result Value    CO2 19.0 (*)     ALT (SGPT) 763 (*)     AST (SGOT) 305 (*)     Alkaline Phosphatase 295 (*)     Total Bilirubin 5.5 (*)     Anion Gap 16.0 (*)     All other components within normal limits    Narrative:     GFR Normal >60  Chronic Kidney Disease <60  Kidney Failure <15     BNP (IN-HOUSE) - Normal    Narrative:     This assay is used as an aid in the diagnosis of individuals suspected of having heart failure. It can be used as an aid in the diagnosis of acute decompensated heart failure (ADHF) in patients presenting with signs and symptoms of ADHF to the emergency department (ED). In addition, NT-proBNP of <300 pg/mL indicates ADHF is not likely.    Age Range Result Interpretation  NT-proBNP Concentration (pg/mL:      <50             Positive            >450                   Gray                 300-450                    Negative             <300    50-75           Positive            >900                  Gray                300-900                  Negative            <300      >75             Positive            >1800                  Gray                300-1800                  Negative            <300   SINGLE HSTROPONIN T - Normal    Narrative:     High Sensitive Troponin T Reference Range:  <14.0 ng/L- Negative Female for AMI  <22.0 ng/L- Negative Male for AMI  >=14 - Abnormal Female indicating possible myocardial injury.  >=22 - Abnormal Male indicating possible myocardial injury.   Clinicians would have to utilize clinical acumen, EKG, Troponin, and serial changes to determine if it is an Acute Myocardial Infarction or myocardial injury due to an underlying chronic condition.        CBC WITH AUTO DIFFERENTIAL - Normal   LIPASE - Normal   POCT PEFORM URINE PREGNANCY - Normal   RAINBOW DRAW    Narrative:     The following orders were created for panel order [place] Draw.  Procedure                               Abnormality         Status                      ---------                               -----------         ------                     Green Top (Gel)[[id number]]                                  Final result               Lavender Top[[id number]]                                     Final result               Gold Top - SST[[id number]]                                   Final result               Alarcon Top[[id number]]                                         Final result               Light Blue Top[[id number]]                                   Final result                 Please view results for these tests on the individual orders.   HCG, QUANTITATIVE, PREGNANCY    Narrative:     HCG Ranges by Gestational Age    Females - non-pregnant premenopausal   </= 1mIU/mL HCG  Females - postmenopausal               </= 7mIU/mL HCG    3 Weeks         5.8 -    71.2 mIU/mL  4 Weeks         9.5 -     750 mIU/mL  5 Weeks         217 -   7,138 mIU/mL  6 Weeks         158 -  31,795 mIU/mL  7 Weeks       3,697 - 163,563 mIU/mL  8 Weeks      32,065 - 149,571 mIU/mL  9 Weeks      63,803 - 151,410 mIU/mL  10 Weeks     46,509 - 186,977 mIU/mL  12 Weeks     27,832 - 210,612 mIU/mL  14 Weeks     13,950 -  62,530 mIU/mL  15 Weeks     12,039 -  70,971 mIU/mL  16 Weeks      9,040 -  56,451 mIU/mL  17 Weeks      8,175 -  55,868 mIU/mL  18 Weeks      8,099 -  58,176 mIU/mL  Results may be falsely decreased if patient taking Biotin.     CBC AND DIFFERENTIAL    Narrative:     The following orders were created for panel order CBC & Differential.  Procedure                               Abnormality         Status                     ---------                               -----------         ------                     CBC Auto Differential[[id number]]        Normal              Final result               Scan Slide[[id number]]                                                                    Please view results for these tests on the individual orders.   GREEN TOP   LAVENDER TOP   GOLD TOP -  SST   GRAY TOP   LIGHT BLUE TOP       Meds Given in ED:   Medications   sodium chloride 0.9 % flush 10 mL (has no administration in time range)   sodium chloride 0.9 % bolus 1,000 mL (0 mL Intravenous Stopped 1/15/24 2302)   Morphine sulfate (PF) injection 4 mg (4 mg Intravenous Given 1/15/24 1857)   ondansetron (ZOFRAN) injection 4 mg (4 mg Intravenous Given 1/15/24 1857)   iopamidol (ISOVUE-300) 61 % injection 85 mL (85 mL Intravenous Given 1/15/24 2054)

## 2024-01-16 NOTE — ANESTHESIA PREPROCEDURE EVALUATION
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   Substance History - negative use     OB/GYN negative ob/gyn ROS         Other                      Anesthesia Plan    ASA 3     general     intravenous induction     Anesthetic plan, risks, benefits, and alternatives have been provided, discussed and informed consent has been obtained with: patient.    CODE STATUS:    Level Of Support Discussed With: Patient  Code Status (Patient has no pulse and is not breathing): CPR (Attempt to Resuscitate)  Medical Interventions (Patient has pulse or is breathing): Full Support

## 2024-01-16 NOTE — PLAN OF CARE
Goal Outcome Evaluation:                   Problem: Adult Inpatient Plan of Care  Goal: Plan of Care Review  Outcome: Ongoing, Progressing  Goal: Patient-Specific Goal (Individualized)  Outcome: Ongoing, Progressing  Goal: Absence of Hospital-Acquired Illness or Injury  Outcome: Ongoing, Progressing  Goal: Optimal Comfort and Wellbeing  Outcome: Ongoing, Progressing  Goal: Readiness for Transition of Care  Outcome: Ongoing, Progressing  Intervention: Mutually Develop Transition Plan  Recent Flowsheet Documentation  Taken 1/16/2024 0311 by Sophia Cordero, RN  Equipment Currently Used at Home: none  Transportation Anticipated: family or friend will provide  Patient/Family Anticipated Services at Transition: none  Patient/Family Anticipates Transition to: home with family

## 2024-01-16 NOTE — H&P
[facility] Medicine Services  HISTORY AND PHYSICAL    Patient Name: Umu Queen  : 1990  MRN: [id number]  Primary Care Physician: Shaina Brennan APRN  Date of admission: 1/15/2024    Subjective   Subjective     Chief Complaint:  Abdominal pain    HPI:  Umu Queen is a 33 y.o. female with history of anemia, anxiety, depression, ovarian cyst, presents to the ED with complaints of right upper quadrant abdominal pain.  Patient reports that she was experiencing some abdominal cramping on Friday.  On Saturday morning she had worsening right upper quadrant pain.  Her pain is worse after eating.  She endorses a loss of appetite, nausea, vomiting, dark-colored urine, lightheadedness, dizziness, abdominal distention, and gray-colored stools.  Yesterday she developed yellow discoloration to her skin.  No fevers, chills, shortness of air, chest pain, dysuria, headaches, or any other complaints at this time.  CT abdomen pelvis and ultrasound of the gallbladder consistent with cholelithiasis with porcelain gallbladder, and dilated common bile duct.  Patient was given a liter of saline in the ED, and is being admitted to the hospital medicine service for further evaluation and management.        Review of Systems   Constitutional:  Positive for appetite change and fatigue. Negative for chills and fever.   HENT: Negative.     Eyes: Negative.    Respiratory: Negative.     Cardiovascular: Negative.    Gastrointestinal:  Positive for abdominal distention, abdominal pain, nausea and vomiting.   Endocrine: Negative.    Genitourinary:  Negative for dysuria, frequency and urgency.        Dark-colored urine   Musculoskeletal: Negative.    Skin: Negative.    Allergic/Immunologic: Negative.    Neurological:  Positive for dizziness and light-headedness. Negative for syncope, weakness and headaches.   Hematological: Negative.    Psychiatric/Behavioral: Negative.                   Personal History     Past Medical History:   Diagnosis Date    Anemia     Anesthesia complication     difficult to wake up    Anxiety     Depression     Gestational hypertension     1st pregnancy    Ovarian cyst     right    PONV (postoperative nausea and vomiting)     Seizures     as a child, hasnt had any seizures in 10 years.             Past Surgical History:   Procedure Laterality Date     SECTION       SECTION N/A 10/27/2023    Procedure:  SECTION REPEAT;  Surgeon: Emma Barrera MD;  Location: [facility] LABOR DELIVERY;  Service: Obstetrics;  Laterality: N/A;    HEMORRHOIDECTOMY         Family History:  family history is not on file.     Social History:  reports that she has never smoked. She has never been exposed to tobacco smoke. She has never used smokeless tobacco. She reports that she does not currently use alcohol. She reports that she does not use drugs.  Social History     Social History Narrative    Not on file       Medications:  Prenatal -, docusate sodium, and ibuprofen    No Known Allergies    Objective   Objective     Vital Signs:   Temp:  [97.6 °F (36.4 °C)] 97.6 °F (36.4 °C)  Heart Rate:  [64-96] 75  Resp:  [16] 16  BP: (127-145)/() 133/84    Physical Exam   Constitutional: Awake, alert, resting in bed,  at bedside  Eyes: PERRLA, sclerae anicteric, no conjunctival injection  HENT: NCAT, mucous membranes moist  Neck: Supple, no thyromegaly, no lymphadenopathy, trachea midline  Respiratory: Clear to auscultation bilaterally, nonlabored respirations   Cardiovascular: RRR, no murmurs, rubs, or gallops, palpable pedal pulses bilaterally  Gastrointestinal: Positive bowel sounds, soft, nontender, nondistended  Musculoskeletal: No bilateral ankle edema, no clubbing or cyanosis to extremities  Psychiatric: Appropriate affect, cooperative  Neurologic: Oriented x 3, strength symmetric in all extremities, Cranial Nerves grossly intact to confrontation,  speech clear  Skin: Jaundice       Result Review:  I have personally reviewed the results from the time of this admission to 1/15/2024 23:21 EST and agree with these findings:  [x]  Laboratory list / accordion  []  Microbiology  [x]  Radiology  [x]  EKG/Telemetry   []  Cardiology/Vascular   []  Pathology  []  Old records  []  Other:  Most notable findings include:     LAB RESULTS:      Lab 01/15/24  1856   WBC 6.42   HEMOGLOBIN 13.1   HEMATOCRIT 40.0   PLATELETS 343   NEUTROS ABS 3.72   IMMATURE GRANS (ABS) 0.02   LYMPHS ABS 2.13   MONOS ABS 0.38   EOS ABS 0.15   MCV 90.1         Lab 01/15/24  1833   SODIUM 136   POTASSIUM 3.9   CHLORIDE 101   CO2 19.0*   ANION GAP 16.0*   BUN 8   CREATININE 0.64   EGFR 121.3   GLUCOSE 92   CALCIUM 9.0         Lab 01/15/24  1833   TOTAL PROTEIN 7.8   ALBUMIN 4.3   GLOBULIN 3.5   ALT (SGPT) 763*   AST (SGOT) 305*   BILIRUBIN 5.5*   ALK PHOS 295*   LIPASE 37         Lab 01/15/24  1833   PROBNP 39.7   HSTROP T <6                 Brief Urine Lab Results  (Last result in the past 365 days)        Color   Clarity   Blood   Leuk Est   Nitrite   Protein   CREAT   Urine HCG        01/15/24 2015               Negative             Microbiology Results (last 10 days)       ** No results found for the last 240 hours. **            US Gallbladder    Result Date: 1/15/2024  US GALLBLADDER Date of Exam: 1/15/2024 9:15 PM EST Indication: RUQ pain, jaundice. Comparison: CT of the abdomen and pelvis with contrast from 1/15/2024 Technique: Grayscale and color Doppler ultrasound evaluation of the right upper quadrant was performed. Findings: The liver is homogeneous in echotexture and normal in echogenicity. No focal liver masses are identified. The hepatic and portal veins are patent with normal directional flow. There are multiple tiny mobile shadowing gallstones in the dependent portions of the gallbladder. Better seen on the CT The nondependent portion of the gallbladder are calcifications  indicative of porcelain gallbladder. No pericholecystic fluid or definitive sludge. The gallbladder wall is 3 mm in thickness. No intrahepatic biliary dilatation is seen although there is distention of the common duct to 1.0 cm. The calcification in the distal common duct clearly seen on the CT from earlier today is likely obscured by bowel gas and not definitively seen on the ultrasound. The pancreas is obscured by bowel gas. The right kidney is 11.2 cm in [id number]-[id number] length.Cortical thickness and echogenicity are preserved.No hydronephrosis or suspicious renal masses. No definitive shadowing stones. No fluid collections are seen in the right upper quadrant.The intrahepatic portions of the IVC are patent. The abdominal aorta is obscured by bowel gas.     Impression: Impression: 1.Cholelithiasis with porcelain gallbladder. The common duct is dilated to 1 cm. The calcification in the distal common duct clearly seen on the CT from earlier today is likely obscured by bowel gas and not definitively seen on the submitted sonographic images. 2.The pancreas and abdominal aorta are obscured by bowel gas. Electronically Signed: Jeremy Lui DO  1/15/2024 9:51 PM EST  Workstation ID: [id number]    CT Abdomen Pelvis With Contrast    Result Date: 1/15/2024  CT ABDOMEN PELVIS W CONTRAST Date of Exam: 1/15/2024 8:43 PM EST Indication: Abdominal pain, acute, nonlocalized RUQ pain, jaundice. Comparison: None available. Technique: Axial CT images were obtained of the abdomen and pelvis following the uneventful intravenous administration of Isovue-300, 85 mL. Reconstructed coronal and sagittal images were also obtained. Automated exposure control and iterative construction methods were used. Findings: Lung Bases: The visualized lung bases and lower mediastinal structures are unremarkable. Peritoneum: No free intraperitoneal air or fluid. Abdominal wall: Unremarkable. Liver: Liver is normal in size and contour. No focal lesions.  Biliary/Gallbladder: The gallbladder is distended. Thin gallbladder wall calcifications as well as small gallstones are visualized. No pericolonic inflammatory changes are visualized. There is no intrahepatic biliary ductal dilatation. There is a distal CBD radiopaque calculus measuring 1.7 cm in craniocaudal length and 0.8 cm in width. There is dilatation of the CBD measuring 1 cm in width. Pancreas: Pancreas is within normal limits. There is no evidence of pancreatic mass or peripancreatic fluid. Spleen: Spleen is normal in size and contour. Gastrointestinal/Mesentery: No evidence of bowel obstruction or gross inflammatory changes.The appendix appears within normal limits.  No mesenteric fluid collections identified. Adrenals: Adrenal glands are unremarkable. Kidneys: The kidneys are in anatomic position. No evidence of nephrolithiasis. No evidence of hydronephrosis or perinephric fat stranding. Bladder: The urinary bladder is unremarkable. Reproductive organs:  Right ovarian lesion measures 3.1 cm containing fat and calcium consistent with dermoid. The uterus and left ovary are unremarkable. Retroperitoneal/Lymph Nodes: No retroperitoneal adenopathy is identified. Vasculature: Unremarkable.   Bony Structures:  No acute fracture or aggressive lesions.     Impression: Impression: Cholelithiasis. There is also evidence of gallbladder wall calcifications compatible with porcelain gallbladder. The gallbladder is distended. Distal CBD calculus measures 1.7 x 0.8 cm. There is secondary dilatation of the CBD measuring 1 cm. There is no  intrahepatic biliary ductal dilatation. 3.1 cm right ovarian dermoid. Consider annual surveillance to ensure stability in size. Electronically Signed: Izaiah Gustafson MD  1/15/2024 9:01 PM EST  Workstation ID: [id number]    XR Chest 1 View    Result Date: 1/15/2024  XR CHEST 1 VW Date of Exam: 1/15/2024 5:45 PM EST Indication: SOA triage protocol Comparison: None available. Findings: There  are no consolidations.No pleural effusion. There is no evidence of pneumothorax. The pulmonary vasculature appears within normal limits. The cardiac and mediastinal silhouette appear unremarkable. No acute osseous abnormality identified.     Impression: Impression: No radiographic evidence of acute chest process. Electronically Signed: Izaiah Gustafson MD  1/15/2024 6:05 PM EST  Workstation ID: [id number]         Assessment & Plan   Assessment & Plan       Porcelain gallbladder    Elevated LFTs    Umu Queen is a 33 y.o. female with history of anemia, anxiety, depression, ovarian cyst, presents to the ED with complaints of right upper quadrant abdominal pain.      Assessment and plan:    Cholelithiasis with porcelain gallbladder  CBD dilation  Elevated LFTs  -- Ultrasound gallbladder shows cholelithiasis with porcelain gallbladder.  The common duct is dilated to 1 cm.  The pancreas and abdominal aorta are obscured by bowel gas  --CT abdomen pelvis shows cholelithiasis with evidence of gallbladder wall calcifications compatible with porcelain gallbladder.  The gallbladder is distended.  Distal CBD calculus measures 1.7 x 0.8 cm.  Secondary to dilatation of the CBD measuring 1 cm.  -- , , total bilirubin 5.5, alkaline phos 295  -- MRCP  -- Consult GI  -- N.p.o.  -- Pain control  -- Consider surgery consult  -- A.m. labs    Ovarian dermoid  -- CT abdomen pelvis shows a 3.1 cm right ovarian dermoid  -- Annual surveillance to ensure stability in size        DVT prophylaxis: Mechanical    CODE STATUS:    Level Of Support Discussed With: Patient  Code Status (Patient has no pulse and is not breathing): CPR (Attempt to Resuscitate)  Medical Interventions (Patient has pulse or is breathing): Full Support      Expected Discharge  TBD  Expected discharge date/ time has not been documented.      This note has been completed as part of a split-shared workflow.     Signature: Electronically signed by Enid PETTY  Desiree, ANGY, 01/15/24, 11:21 PM EST.       Attending   Admission Attestation       I have performed an independent face-to-face diagnostic evaluation including performing an independent physical examination.  I approve of the documented plan of care above that was reviewed and developed with the advanced practice clinician (APC) and take responsibility for that plan along with its associated risks.  I have updated the HPI as appropriate.    Brief HPI    Ms. Solomon is a 33-year-old female brought to the emergency department for evaluation abdominal pain.  Patient is 10 weeks post delivery of her son.  Patient endorses associated loss of appetite, nausea, vomiting.  Patient was prompted to come in this evening due to increasing jaundice.  Denies any recent travel or sick contacts.  Evidence of dilated common bile duct identified on CT scan, GI was consulted and recommended MRCP, n.p.o. after midnight, plan for ERCP in the a.m.  Pain control and antiemetics will be provided.  IV fluids as well.  Patient will likely need cholecystectomy in the future due to porcelain gallbladder, educated the patient on the importance of this.  Patient verbalized understanding.  Patient will need follow-up with general surgery outpatient at discharge.    Attending Physical Exam:  Temp:  [97.6 °F (36.4 °C)] 97.6 °F (36.4 °C)  Heart Rate:  [64-96] 75  Resp:  [16] 16  BP: (127-145)/() 133/84    Constitutional: Awake, alert, uncomfortable appearing  Eyes: PERRLA, sclerae anicteric, no conjunctival injection  HENT: NCAT, mucous membranes moist  Neck: Supple, no thyromegaly, no lymphadenopathy, trachea midline  Respiratory: Clear to auscultation bilaterally, nonlabored respirations   Cardiovascular: RRR, no murmurs, rubs, or gallops, palpable pedal pulses bilaterally  Gastrointestinal: Positive bowel sounds, soft, tenderness palpation right upper quadrant and epigastrium, nondistended  Musculoskeletal: No bilateral ankle edema, no  clubbing or cyanosis to extremities  Psychiatric: Appropriate affect, cooperative  Neurologic: Oriented x 3, strength symmetric in all extremities, Cranial Nerves grossly intact to confrontation, speech clear  Skin: No rashes      Result Review:  I have personally reviewed the results from the time of this admission to 1/15/2024 23:50 EST and agree with these findings:  [x]  Laboratory list / accordion  [x]  Microbiology  [x]  Radiology  [x]  EKG/Telemetry   [x]  Cardiology/Vascular   []  Pathology  [x]  Old records  []  Other:  Most notable findings include: CT scan showing cholelithiasis with porcelain gallbladder, dilated common bile duct.    Assessment and Plan:    See assessment and plan documented by APC above and updated/edited by me as appropriate.    Felipe Zepeda DO  01/15/24

## 2024-01-17 ENCOUNTER — ANESTHESIA EVENT (OUTPATIENT)
Dept: GASTROENTEROLOGY | Facility: HOSPITAL | Age: 34
End: 2024-01-17

## 2024-01-17 ENCOUNTER — APPOINTMENT (OUTPATIENT)
Dept: GENERAL RADIOLOGY | Facility: HOSPITAL | Age: 34
End: 2024-01-17

## 2024-01-17 ENCOUNTER — ANESTHESIA (OUTPATIENT)
Dept: GASTROENTEROLOGY | Facility: HOSPITAL | Age: 34
End: 2024-01-17

## 2024-01-17 LAB
ALBUMIN SERPL-MCNC: 4.1 G/DL (ref 3.5–5.2)
ALBUMIN/GLOB SERPL: 1.2 G/DL
ALP SERPL-CCNC: 297 U/L (ref 39–117)
ALT SERPL W P-5'-P-CCNC: 470 U/L (ref 1–33)
ANION GAP SERPL CALCULATED.3IONS-SCNC: 15 MMOL/L (ref 5–15)
AST SERPL-CCNC: 152 U/L (ref 1–32)
BILIRUB SERPL-MCNC: 4.6 MG/DL (ref 0–1.2)
BUN SERPL-MCNC: 9 MG/DL (ref 6–20)
BUN/CREAT SERPL: 12.3 (ref 7–25)
CALCIUM SPEC-SCNC: 9.2 MG/DL (ref 8.6–10.5)
CHLORIDE SERPL-SCNC: 100 MMOL/L (ref 98–107)
CO2 SERPL-SCNC: 20 MMOL/L (ref 22–29)
CREAT SERPL-MCNC: 0.73 MG/DL (ref 0.57–1)
DEPRECATED RDW RBC AUTO: 47.2 FL (ref 37–54)
EGFRCR SERPLBLD CKD-EPI 2021: 112.9 ML/MIN/1.73
ERYTHROCYTE [DISTWIDTH] IN BLOOD BY AUTOMATED COUNT: 14 % (ref 12.3–15.4)
GLOBULIN UR ELPH-MCNC: 3.3 GM/DL
GLUCOSE SERPL-MCNC: 72 MG/DL (ref 65–99)
HCT VFR BLD AUTO: 42.7 % (ref 34–46.6)
HGB BLD-MCNC: 13.6 G/DL (ref 12–15.9)
LIPASE SERPL-CCNC: 18 U/L (ref 13–60)
MCH RBC QN AUTO: 29.6 PG (ref 26.6–33)
MCHC RBC AUTO-ENTMCNC: 31.9 G/DL (ref 31.5–35.7)
MCV RBC AUTO: 92.8 FL (ref 79–97)
PLATELET # BLD AUTO: 396 10*3/MM3 (ref 140–450)
PMV BLD AUTO: 11.6 FL (ref 6–12)
POTASSIUM SERPL-SCNC: 4 MMOL/L (ref 3.5–5.2)
PROT SERPL-MCNC: 7.4 G/DL (ref 6–8.5)
RBC # BLD AUTO: 4.6 10*6/MM3 (ref 3.77–5.28)
SODIUM SERPL-SCNC: 135 MMOL/L (ref 136–145)
WBC NRBC COR # BLD AUTO: 15.44 10*3/MM3 (ref 3.4–10.8)

## 2024-01-17 PROCEDURE — 25010000002 SUGAMMADEX 200 MG/2ML SOLUTION: Performed by: NURSE ANESTHETIST, CERTIFIED REGISTERED

## 2024-01-17 PROCEDURE — 25010000002 PROPOFOL 10 MG/ML EMULSION: Performed by: NURSE ANESTHETIST, CERTIFIED REGISTERED

## 2024-01-17 PROCEDURE — C1769 GUIDE WIRE: HCPCS | Performed by: INTERNAL MEDICINE

## 2024-01-17 PROCEDURE — 0FC98ZZ EXTIRPATION OF MATTER FROM COMMON BILE DUCT, VIA NATURAL OR ARTIFICIAL OPENING ENDOSCOPIC: ICD-10-PCS | Performed by: INTERNAL MEDICINE

## 2024-01-17 PROCEDURE — 80053 COMPREHEN METABOLIC PANEL: CPT | Performed by: SURGERY

## 2024-01-17 PROCEDURE — 25010000002 ONDANSETRON PER 1 MG: Performed by: NURSE ANESTHETIST, CERTIFIED REGISTERED

## 2024-01-17 PROCEDURE — 25810000003 LACTATED RINGERS PER 1000 ML: Performed by: ANESTHESIOLOGY

## 2024-01-17 PROCEDURE — 25010000002 FENTANYL CITRATE (PF) 100 MCG/2ML SOLUTION: Performed by: NURSE ANESTHETIST, CERTIFIED REGISTERED

## 2024-01-17 PROCEDURE — 25010000002 MORPHINE PER 10 MG: Performed by: SURGERY

## 2024-01-17 PROCEDURE — C2625 STENT, NON-COR, TEM W/DEL SY: HCPCS | Performed by: INTERNAL MEDICINE

## 2024-01-17 PROCEDURE — 85027 COMPLETE CBC AUTOMATED: CPT | Performed by: SURGERY

## 2024-01-17 PROCEDURE — 25010000002 ONDANSETRON PER 1 MG: Performed by: SURGERY

## 2024-01-17 PROCEDURE — 0F7D8DZ DILATION OF PANCREATIC DUCT WITH INTRALUMINAL DEVICE, VIA NATURAL OR ARTIFICIAL OPENING ENDOSCOPIC: ICD-10-PCS | Performed by: INTERNAL MEDICINE

## 2024-01-17 PROCEDURE — 99232 SBSQ HOSP IP/OBS MODERATE 35: CPT | Performed by: INTERNAL MEDICINE

## 2024-01-17 PROCEDURE — 43264 ERCP REMOVE DUCT CALCULI: CPT | Performed by: INTERNAL MEDICINE

## 2024-01-17 PROCEDURE — BF101ZZ FLUOROSCOPY OF BILE DUCTS USING LOW OSMOLAR CONTRAST: ICD-10-PCS | Performed by: INTERNAL MEDICINE

## 2024-01-17 PROCEDURE — 83690 ASSAY OF LIPASE: CPT | Performed by: INTERNAL MEDICINE

## 2024-01-17 PROCEDURE — 25010000002 HYDROMORPHONE PER 4 MG: Performed by: SURGERY

## 2024-01-17 PROCEDURE — 43274 ERCP DUCT STENT PLACEMENT: CPT | Performed by: INTERNAL MEDICINE

## 2024-01-17 PROCEDURE — 74330 X-RAY BILE/PANC ENDOSCOPY: CPT

## 2024-01-17 DEVICE — PANCREATIC STENT KIT
Type: IMPLANTABLE DEVICE | Site: BILE DUCT | Status: FUNCTIONAL
Brand: ADVANIX™ PANCREATIC STENT KIT

## 2024-01-17 RX ORDER — ROCURONIUM BROMIDE 10 MG/ML
INJECTION, SOLUTION INTRAVENOUS AS NEEDED
Status: DISCONTINUED | OUTPATIENT
Start: 2024-01-17 | End: 2024-01-17 | Stop reason: SURG

## 2024-01-17 RX ORDER — ONDANSETRON 2 MG/ML
4 INJECTION INTRAMUSCULAR; INTRAVENOUS ONCE AS NEEDED
Status: COMPLETED | OUTPATIENT
Start: 2024-01-17 | End: 2024-01-17

## 2024-01-17 RX ORDER — INDOMETHACIN 50 MG/1
SUPPOSITORY RECTAL AS NEEDED
Status: DISCONTINUED | OUTPATIENT
Start: 2024-01-17 | End: 2024-01-17 | Stop reason: HOSPADM

## 2024-01-17 RX ORDER — ONDANSETRON 2 MG/ML
INJECTION INTRAMUSCULAR; INTRAVENOUS AS NEEDED
Status: DISCONTINUED | OUTPATIENT
Start: 2024-01-17 | End: 2024-01-17 | Stop reason: SURG

## 2024-01-17 RX ORDER — SODIUM CHLORIDE, SODIUM LACTATE, POTASSIUM CHLORIDE, CALCIUM CHLORIDE 600; 310; 30; 20 MG/100ML; MG/100ML; MG/100ML; MG/100ML
50 INJECTION, SOLUTION INTRAVENOUS CONTINUOUS
Status: DISCONTINUED | OUTPATIENT
Start: 2024-01-17 | End: 2024-01-18 | Stop reason: HOSPADM

## 2024-01-17 RX ORDER — PROPOFOL 10 MG/ML
VIAL (ML) INTRAVENOUS AS NEEDED
Status: DISCONTINUED | OUTPATIENT
Start: 2024-01-17 | End: 2024-01-17 | Stop reason: SURG

## 2024-01-17 RX ORDER — IPRATROPIUM BROMIDE AND ALBUTEROL SULFATE 2.5; .5 MG/3ML; MG/3ML
3 SOLUTION RESPIRATORY (INHALATION) ONCE AS NEEDED
Status: DISCONTINUED | OUTPATIENT
Start: 2024-01-17 | End: 2024-01-17 | Stop reason: HOSPADM

## 2024-01-17 RX ORDER — FENTANYL CITRATE 50 UG/ML
INJECTION, SOLUTION INTRAMUSCULAR; INTRAVENOUS AS NEEDED
Status: DISCONTINUED | OUTPATIENT
Start: 2024-01-17 | End: 2024-01-17 | Stop reason: SURG

## 2024-01-17 RX ORDER — LIDOCAINE HYDROCHLORIDE 10 MG/ML
INJECTION, SOLUTION EPIDURAL; INFILTRATION; INTRACAUDAL; PERINEURAL AS NEEDED
Status: DISCONTINUED | OUTPATIENT
Start: 2024-01-17 | End: 2024-01-17 | Stop reason: SURG

## 2024-01-17 RX ADMIN — ONDANSETRON 4 MG: 2 INJECTION INTRAMUSCULAR; INTRAVENOUS at 11:41

## 2024-01-17 RX ADMIN — PROPOFOL 200 MG: 10 INJECTION, EMULSION INTRAVENOUS at 11:26

## 2024-01-17 RX ADMIN — FENTANYL CITRATE 50 MCG: 50 INJECTION, SOLUTION INTRAMUSCULAR; INTRAVENOUS at 11:26

## 2024-01-17 RX ADMIN — ONDANSETRON 4 MG: 2 INJECTION INTRAMUSCULAR; INTRAVENOUS at 07:40

## 2024-01-17 RX ADMIN — HYDROMORPHONE HYDROCHLORIDE 0.5 MG: 1 INJECTION, SOLUTION INTRAMUSCULAR; INTRAVENOUS; SUBCUTANEOUS at 13:05

## 2024-01-17 RX ADMIN — FENTANYL CITRATE 50 MCG: 50 INJECTION, SOLUTION INTRAMUSCULAR; INTRAVENOUS at 11:32

## 2024-01-17 RX ADMIN — ONDANSETRON HYDROCHLORIDE 4 MG: 2 SOLUTION INTRAMUSCULAR; INTRAVENOUS at 12:26

## 2024-01-17 RX ADMIN — Medication 10 ML: at 20:42

## 2024-01-17 RX ADMIN — SUGAMMADEX 200 MG: 100 INJECTION, SOLUTION INTRAVENOUS at 12:02

## 2024-01-17 RX ADMIN — ROCURONIUM BROMIDE 40 MG: 10 SOLUTION INTRAVENOUS at 11:26

## 2024-01-17 RX ADMIN — ACETAMINOPHEN 650 MG: 325 TABLET ORAL at 21:43

## 2024-01-17 RX ADMIN — LIDOCAINE HYDROCHLORIDE 50 MG: 10 SOLUTION INTRAVENOUS at 11:26

## 2024-01-17 RX ADMIN — HYDROMORPHONE HYDROCHLORIDE 0.5 MG: 1 INJECTION, SOLUTION INTRAMUSCULAR; INTRAVENOUS; SUBCUTANEOUS at 07:41

## 2024-01-17 RX ADMIN — MORPHINE SULFATE 1 MG: 2 INJECTION, SOLUTION INTRAMUSCULAR; INTRAVENOUS at 03:44

## 2024-01-17 RX ADMIN — SODIUM CHLORIDE, POTASSIUM CHLORIDE, SODIUM LACTATE AND CALCIUM CHLORIDE 50 ML/HR: 600; 310; 30; 20 INJECTION, SOLUTION INTRAVENOUS at 09:56

## 2024-01-17 NOTE — PROGRESS NOTES
[facility] Medicine Services  PROGRESS NOTE    Patient Name: Umu Queen  : 1990  MRN: [id number]    Date of Admission: 1/15/2024  Primary Care Physician: Shaina Brennan APRN    Subjective   Subjective     CC:  Abdominal pain    HPI:  S/p mu with IAC yesterday.  Continues to c/o abdominal pain with just ice.  Nurse notes urine still orange colored.        Objective   Objective     Vital Signs:   Temp:  [97 °F (36.1 °C)-99.3 °F (37.4 °C)] 99.3 °F (37.4 °C)  Heart Rate:  [] 110  Resp:  [14-24] 22  BP: (103-142)/(63-99) 133/93  Flow (L/min):  [2-4] 2     Physical Exam:  Constitutional: No acute distress, awake, alert  HENT: NCAT, mucous membranes moist  Respiratory: Clear to auscultation bilaterally, respiratory effort normal   Cardiovascular: RRR, no murmurs, rubs, or gallops  Gastrointestinal: Positive bowel sounds, soft, nontender, nondistended  Musculoskeletal: No bilateral ankle edema  Psychiatric: Appropriate affect, cooperative  Neurologic: Oriented x 3, strength symmetric in all extremities, Cranial Nerves grossly intact to confrontation, speech clear  Skin: No rashes      Results Reviewed:  LAB RESULTS:      Lab 01/17/24  0331 01/16/24  0427 01/15/24  1856   WBC 15.44* 6.50 6.42   HEMOGLOBIN 13.6 11.9* 13.1   HEMATOCRIT 42.7 36.7 40.0   PLATELETS 396 311 343   NEUTROS ABS  --  3.39 3.72   IMMATURE GRANS (ABS)  --  0.02 0.02   LYMPHS ABS  --  2.47 2.13   MONOS ABS  --  0.44 0.38   EOS ABS  --  0.17 0.15   MCV 92.8 90.0 90.1         Lab 01/17/24  0331 01/16/24  0427 01/15/24  1833   SODIUM 135* 144 136   POTASSIUM 4.0 3.6 3.9   CHLORIDE 100 109* 101   CO2 20.0* 22.0 19.0*   ANION GAP 15.0 13.0 16.0*   BUN 9 7 8   CREATININE 0.73 0.61 0.64   EGFR 112.9 122.8 121.3   GLUCOSE 72 89 92   CALCIUM 9.2 8.4* 9.0         Lab 24  0331 24  0427 01/15/24  1833   TOTAL PROTEIN 7.4 6.3 7.8   ALBUMIN 4.1 3.6 4.3   GLOBULIN 3.3  --  3.5   ALT (SGPT) 470*  528* 763*   AST (SGOT) 152* 164* 305*   BILIRUBIN 4.6* 4.1* 5.5*   INDIRECT BILIRUBIN  --  0.2  --    BILIRUBIN DIRECT  --  3.9*  --    ALK PHOS 297* 237* 295*   LIPASE  --   --  37         Lab 01/15/24  1833   PROBNP 39.7   HSTROP T <6                 Brief Urine Lab Results  (Last result in the past 365 days)        Color   Clarity   Blood   Leuk Est   Nitrite   Protein   CREAT   Urine HCG        01/16/24 1733               Negative               Microbiology Results Abnormal       None            FL Cholangiogram Operative    Result Date: 1/16/2024  FL CHOLANGIOGRAM OPERATIVE Date of Exam: 1/16/2024 7:38 PM EST Indication: IOC. Comparison: None available. Technique:  Digital spot images were obtained from an intraoperative cholangiogram procedure performed by the surgeon. Fluoroscopic Time: 23.9 seconds Findings: All images are severely degraded by motion artifact. Study is nondiagnostic     Impression: Impression: Nondiagnostic intraoperative cholangiogram secondary to motion Electronically Signed: Darren Billings MD  1/16/2024 9:06 PM EST  Workstation ID: [id number]    MRI abdomen wo contrast mrcp    Result Date: 1/16/2024  MRI ABDOMEN WO CONTRAST MRCP Date of Exam: 1/16/2024 12:43 AM EST Indication: Dilated CBD, jaundice, elevated LFTs.  Comparison: None available. Technique:  Routine multiplanar/multisequence images of the abdomen were obtained with MRCP sequences without contrast administration. Findings: MR Abdomen: The liver size and contour are normal without evidence of fibrosis.  No hepatic mass or steatosis.   There is conventional and patent hepatic arterial anatomy.  Patent portal and hepatic veins.  The SMV and splenic veins are patent.  No ascites or intraabdominal fluid collection. The pancreas enhances homogeneously. No peripancreatic fluid collection, pancreatic necrosis, or pancreatic pseudocyst.  The spleen is normal.  The adrenal glands are of normal size and morphology. The kidneys demonstrate  no parenchymal mass or hydronephrosis. No evidence of dilated bowel or obstruction. No abnormal areas of contrast enhancement. The abdominal aorta is normal in caliber without aneurysmal dilatation. The celiac, splenic, and superior mesenteric arteries are patent.  No lymphadenopathy. MRCP: There are several stones within the gallbladder. There is no evidence of pericholecystic fluid or gallbladder wall thickening. The intra and extrahepatic biliary tree appears unremarkable.  The common bile duct measures up to mm. No filling defects within the common bile duct to suggest choledocholithiasis.  The pancreatic duct is of normal diameter without evidence of dilation or stricture.  No evidence of pancreatic divisum.     Impression: Cholelithiasis. No convincing choledocholithiasis. No inflammatory change to indicate pancreatitis or acute cholecystitis. Electronically Signed: Wilmar Porras MD  1/16/2024 1:15 AM EST  Workstation ID: [id number]    US Gallbladder    Result Date: 1/15/2024  US GALLBLADDER Date of Exam: 1/15/2024 9:15 PM EST Indication: RUQ pain, jaundice. Comparison: CT of the abdomen and pelvis with contrast from 1/15/2024 Technique: Grayscale and color Doppler ultrasound evaluation of the right upper quadrant was performed. Findings: The liver is homogeneous in echotexture and normal in echogenicity. No focal liver masses are identified. The hepatic and portal veins are patent with normal directional flow. There are multiple tiny mobile shadowing gallstones in the dependent portions of the gallbladder. Better seen on the CT The nondependent portion of the gallbladder are calcifications indicative of porcelain gallbladder. No pericholecystic fluid or definitive sludge. The gallbladder wall is 3 mm in thickness. No intrahepatic biliary dilatation is seen although there is distention of the common duct to 1.0 cm. The calcification in the distal common duct clearly seen on the CT from earlier today is likely  obscured by bowel gas and not definitively seen on the ultrasound. The pancreas is obscured by bowel gas. The right kidney is 11.2 cm in yiro-ht-rhva length.Cortical thickness and echogenicity are preserved.No hydronephrosis or suspicious renal masses. No definitive shadowing stones. No fluid collections are seen in the right upper quadrant.The intrahepatic portions of the IVC are patent. The abdominal aorta is obscured by bowel gas.     Impression: Impression: 1.Cholelithiasis with porcelain gallbladder. The common duct is dilated to 1 cm. The calcification in the distal common duct clearly seen on the CT from earlier today is likely obscured by bowel gas and not definitively seen on the submitted sonographic images. 2.The pancreas and abdominal aorta are obscured by bowel gas. Electronically Signed: Jeremy Lui DO  1/15/2024 9:51 PM EST  Workstation ID: [id number]    CT Abdomen Pelvis With Contrast    Result Date: 1/15/2024  CT ABDOMEN PELVIS W CONTRAST Date of Exam: 1/15/2024 8:43 PM EST Indication: Abdominal pain, acute, nonlocalized RUQ pain, jaundice. Comparison: None available. Technique: Axial CT images were obtained of the abdomen and pelvis following the uneventful intravenous administration of Isovue-300, 85 mL. Reconstructed coronal and sagittal images were also obtained. Automated exposure control and iterative construction methods were used. Findings: Lung Bases: The visualized lung bases and lower mediastinal structures are unremarkable. Peritoneum: No free intraperitoneal air or fluid. Abdominal wall: Unremarkable. Liver: Liver is normal in size and contour. No focal lesions. Biliary/Gallbladder: The gallbladder is distended. Thin gallbladder wall calcifications as well as small gallstones are visualized. No pericolonic inflammatory changes are visualized. There is no intrahepatic biliary ductal dilatation. There is a distal CBD radiopaque calculus measuring 1.7 cm in craniocaudal length and 0.8 cm  in width. There is dilatation of the CBD measuring 1 cm in width. Pancreas: Pancreas is within normal limits. There is no evidence of pancreatic mass or peripancreatic fluid. Spleen: Spleen is normal in size and contour. Gastrointestinal/Mesentery: No evidence of bowel obstruction or gross inflammatory changes.The appendix appears within normal limits.  No mesenteric fluid collections identified. Adrenals: Adrenal glands are unremarkable. Kidneys: The kidneys are in anatomic position. No evidence of nephrolithiasis. No evidence of hydronephrosis or perinephric fat stranding. Bladder: The urinary bladder is unremarkable. Reproductive organs:  Right ovarian lesion measures 3.1 cm containing fat and calcium consistent with dermoid. The uterus and left ovary are unremarkable. Retroperitoneal/Lymph Nodes: No retroperitoneal adenopathy is identified. Vasculature: Unremarkable.   Bony Structures:  No acute fracture or aggressive lesions.     Impression: Impression: Cholelithiasis. There is also evidence of gallbladder wall calcifications compatible with porcelain gallbladder. The gallbladder is distended. Distal CBD calculus measures 1.7 x 0.8 cm. There is secondary dilatation of the CBD measuring 1 cm. There is no  intrahepatic biliary ductal dilatation. 3.1 cm right ovarian dermoid. Consider annual surveillance to ensure stability in size. Electronically Signed: Izaiah Gustafson MD  1/15/2024 9:01 PM EST  Workstation ID: [id number]    XR Chest 1 View    Result Date: 1/15/2024  XR CHEST 1 VW Date of Exam: 1/15/2024 5:45 PM EST Indication: SOA triage protocol Comparison: None available. Findings: There are no consolidations.No pleural effusion. There is no evidence of pneumothorax. The pulmonary vasculature appears within normal limits. The cardiac and mediastinal silhouette appear unremarkable. No acute osseous abnormality identified.     Impression: Impression: No radiographic evidence of acute chest process. Electronically  Signed: Izaiah Gustafson MD  1/15/2024 6:05 PM EST  Workstation ID: [id number]         Current medications:  Scheduled Meds:senna-docusate sodium, 2 tablet, Oral, BID  sodium chloride, 10 mL, Intravenous, Q12H      Continuous Infusions:lactated ringers, 50 mL/hr, Last Rate: 50 mL/hr (01/17/24 1123)      PRN Meds:.  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        Brief Hospital Course to date:  Umu Queen is a 33 y.o. female with history of anemia, anxiety, depression, ovarian cyst, presents to the ED with complaints of right upper quadrant abdominal pain.       Assessment and plan:     Cholelithiasis with porcelain gallbladder  CBD dilation  Elevated LFTs  -- Ultrasound gallbladder shows cholelithiasis with porcelain gallbladder.  The common duct is dilated to 1 cm.  The pancreas and abdominal aorta are obscured by bowel gas  --CT abdomen pelvis shows cholelithiasis with evidence of gallbladder wall calcifications compatible with porcelain gallbladder.  The gallbladder is distended.  Distal CBD calculus measures 1.7 x 0.8 cm.  Secondary to dilatation of the CBD measuring 1 cm.  -- MRCP shows cholelithiasis  --s/p mu with IAC per Dr. Guevara/general surgery on 1/16.  He states that ?of possible CBC obstruction on IAC so recs ERCP  --s/p ERCP today per GI with sphincterotomy, balloon sweep yielded 2 stones and large amount of  sludge.  GI recs KUB in 1 week, if PD stent remains in place will need removal by EGD     Ovarian dermoid  -- CT abdomen pelvis shows a 3.1 cm right ovarian dermoid  -- Annual surveillance to ensure stability in size        Expected Discharge Location and Transportation:   Expected Discharge   Expected Discharge Date: 1/18/2024; Expected Discharge Time:      DVT prophylaxis:  Mechanical DVT prophylaxis orders are present.     AM-PAC 6 Clicks Score (PT): 24 (01/17/24 6648)    CODE STATUS:   Code Status and Medical Interventions:   Ordered at: 01/16/24 4130     Level Of Support Discussed With:    Patient     Code Status (Patient has no pulse and is not breathing):    CPR (Attempt to Resuscitate)     Medical Interventions (Patient has pulse or is breathing):    Full       Jeremy Murphy MD  01/17/24

## 2024-01-17 NOTE — PROGRESS NOTES
"Patient Name:  Umu Queen  YOB: 1990  [id number]    Surgery Progress Note    Date of visit: 1/17/2024    Subjective   Patient continues with intermittent abdominal pain.  No nausea or vomiting.  No fevers or chills.         Objective       /78 (BP Location: Right arm, Patient Position: Lying)   Pulse 102   Temp 97 °F (36.1 °C) (Temporal)   Resp 20   Ht 162.6 cm (64.02\")   Wt 113 kg (249 lb)   LMP 12/07/2023 Comment: 10/27/23 baby  SpO2 95%   Breastfeeding Yes   BMI 42.72 kg/m²     Intake/Output Summary (Last 24 hours) at 1/17/2024 1219  Last data filed at 1/17/2024 0524  Gross per 24 hour   Intake 1100 ml   Output 950 ml   Net 150 ml       CV:  Rhythm regular and rate regular  L:  Clear to auscultation bilaterally  Abd:  Bowel sounds positive, soft, appropriately tender, dressings clean dry and intact  Ext:  No cyanosis, clubbing, edema    Recent labs and imaging that are back at this time have been reviewed.   WBC 15.4  Hemoglobin 13.6  Creatinine 0.73      Total bilirubin 4.6       Assessment & Plan     Patient postop day 1 from laparoscopic cholecystectomy with intraoperative cholangiogram for choledocholithiasis.  Per the cholangiogram there is perhaps continued common bile duct obstruction.  I discussed the case with Dr. Brunner we will plan for ERCP today.  Okay to advance diet as tolerated after ERCP from surgical perspective.  Out of bed, incentive spirometer, DVT prophylaxis.        eDlgado Guevara MD  1/17/2024  12:19 EST      "

## 2024-01-17 NOTE — OP NOTE
Operative Report    Patient Name:  Umu Queen  YOB: 1990  [id number]  1/16/2024      PREOPERATIVE DIAGNOSIS: Choledocholithiasis      POSTOPERATIVE DIAGNOSIS: Same        PROCEDURE PERFORMED:     Laparoscopic cholecystectomy with intra-operative cholangiography        SURGEON: Delgado Guevara MD      ASSISTANT: None        SPECIMENS: Gallbladder and contents        ANESTHESIA: General.       EBL: Minimal        FINDINGS:     1. Gallbladder dilated but not inflamed    2. Intra-operative cholangiogram demonstrated filling of the cystic, common, right and left hepatic ducts but without significant flow of contrast into the duodenum without retained stone or filling defect, with dilation of cystic and common bile duct       INDICATIONS:      The patient is a 33 y.o. female with a history of abdominal pain, concerning for gallbladder disease. Pre-operative imaging including U/S and CT scan identified a dilated common bile duct as well as cholelithiasis.  The patient had increased LFTs which improved after admission.  The risks and benefits of laparoscopic cholecystectomy with cholangiography were discussed with the patient and they agreed to proceed.       DESCRIPTION OF PROCEDURE:     After obtaining informed consent, the patient was taken to the operating room and placed in the supine position. After appropriate DVT and antibiotic prophylaxis, general anesthesia was induced. The abdomen was prepped and draped in standard sterile fashion. After infiltrating the skin with local anesthetic, a 5mm skin incision was made in the left upper quadrant of the abdominal wall at Ramos's point.  A 5 mm laparoscopic port was placed using the Optiview technique into the peritoneal cavity.  The abdomen was insufflated with carbon dioxide gas to a pressure of 15 mmHg, and a laparoscope advanced through the trocar and the abdominal contents were inspected. There was no evidence of bowel, bladder, or  "visceral injury with entrance of the trocar. At this point, after infiltrating the skin with local anesthetic, a standard laparoscopic cholecystectomy trocar placement schema was followed.     The gallbladder was dilated but not inflamed.  This was needle decompressed with return of dark green bile.  The gallbladder was grasped and elevated superiorly. Using meticulous blunt dissection, the cystic duct and cystic artery were bluntly dissected free of other structures and clearly identified using the \"Critical View\" technique, with only two structures going into and out of the gallbladder.  The cystic duct was noted to be dilated.     The cystic duct was then clipped at its junction with the infundibulum of the gallbladder, and transected across 50% of its circumference. A cholangiogram catheter was then placed within the duct, and on-table cholangiography under fluoroscopy was obtained. Intra-operative cholangiogram demonstrated filling of the cystic, common, right and left hepatic ducts but without significant flow of contrast into the duodenum without retained stone or filling defect, with dilation of cystic and common bile duct     The gallbladder was then dissected free of the gallbladder fossa using a combination of electrocautery and blunt dissection. The gallbladder was then placed in an Endo Catch bag, and removed from the supraumbilical trocar site.      The right upper quadrant was then inspected. The cystic duct and cystic artery stumps were intact without bleeding or biliary leak. The right upper quadrant was irrigated with saline until clear. The liver bed was hemostatic. The abdomen was deflated and reinsufflated to make sure pneumoperitoneum was not tamponading any bleeding and there was none.  The supraumbilical trocar was removed and the fascial defect was reapproximated using an 0 Vicryl suture and the laparoscopic suture passer.  All remaining trocars were removed under direct laparoscopic " visualization. The skin incisions were closed in each area using absorbable subcuticular suture. The incisions were then dressed in standard sterile fashion. The patient recovered from anesthesia, was extubated in the operating room, and transferred to the PACU in stable condition.  All sponge and needle counts were correct times two at the completion of the procedure. There were no immediate complications.     Delgado Guevara MD  1/16/2024  20:55 EST

## 2024-01-17 NOTE — BRIEF OP NOTE
ENDOSCOPIC RETROGRADE CHOLANGIOPANCREATOGRAPHY  Progress Note    Umu Queen  1/17/2024    First wire pass entered the pancreas duct.  A prophylactic 4 Romanian by 3 cm PD stent was deployed.  Over the stent, an 025 wire was used to cannulate the common bile duct.  Cholangiogram revealed filling defect in the distal common bile duct consistent with stone.  Sphincterotomy performed without bleeding.  Balloon sweeps yielded 2 stones and a large amount of sludge.  No stones remain.  Bile flowed freely at conclusion of procedure.  No bile leak seen.    >> KUB in 1 week.  If PD stent remains in place, will need removal by EGD.    Mark I. Brunner, MD     Date: 1/17/2024  Time: 12:10 EST

## 2024-01-17 NOTE — ANESTHESIA POSTPROCEDURE EVALUATION
Patient: Umu Queen    Procedure Summary       Date: 01/17/24 Room / Location:  [facility]    Anesthesia Start: 1123 Anesthesia Stop: 1217    Procedure: ENDOSCOPIC RETROGRADE CHOLANGIOPANCREATOGRAPHY Diagnosis:     Surgeons: Brunner, Mark I, MD Provider: Ben Hull MD    Anesthesia Type: general ASA Status: 3            Anesthesia Type: general    Vitals  Vitals Value Taken Time   BP     Temp     Pulse 118 01/17/24 1217   Resp     SpO2 94 % 01/17/24 1217   Vitals shown include unfiled device data.    /99, RR15, T 99.3    Post Anesthesia Care and Evaluation    Patient location during evaluation: PACU  Patient participation: complete - patient participated  Level of consciousness: awake and alert  Pain management: adequate    Airway patency: patent  Anesthetic complications: No anesthetic complications  PONV Status: none  Cardiovascular status: hemodynamically stable and acceptable  Respiratory status: nonlabored ventilation, acceptable and nasal cannula  Hydration status: acceptable

## 2024-01-17 NOTE — ANESTHESIA PROCEDURE NOTES
Airway  Urgency: elective    Date/Time: 1/17/2024 11:29 AM  Airway not difficult    General Information and Staff    Patient location during procedure: OR  CRNA/CAA: Virgen Hameed CRNA    Indications and Patient Condition  Indications for airway management: airway protection    Preoxygenated: yes  MILS not maintained throughout  Mask difficulty assessment: 1 - vent by mask    Final Airway Details  Final airway type: endotracheal airway      Successful airway: ETT  Cuffed: yes   Successful intubation technique: direct laryngoscopy  Endotracheal tube insertion site: oral  Blade: Whitney  Blade size: 3  ETT size (mm): 7.5  Cormack-Lehane Classification: grade I - full view of glottis  Placement verified by: chest auscultation and capnometry   Measured from: lips  ETT/EBT  to lips (cm): 21  Number of attempts at approach: 1  Assessment: lips, teeth, and gum same as pre-op and atraumatic intubation    Additional Comments  Negative epigastric sounds, Breath sound equal bilaterally with symmetric chest rise and fall

## 2024-01-17 NOTE — LACTATION NOTE
Received a call from PCN caring for Ms. Queen. RN wanted to know if patient could still breastfeed her baby if she receives morphine. I informed her that morphine, Norco & percocet are all safe to give while breastfeeding as these medications are routinely given to patients on the maternity floor. I also mentioned if patient needs additional lactation support with supplies for milk storage to let me know.           Maksim ALVA, RNC-MNN, CLC  
left thumb

## 2024-01-17 NOTE — PROGRESS NOTES
Discussed with Dr. Pteer Guevara MD.  Intraoperative cholangiogram was abnormal showing dilated duct and no appreciable flow of contrast to the duodenum.  LFTs remain abnormal this morning, with bilirubin slightly higher than yesterday.  Patient continues to have intermittent biliary type pain.  Discussed with patient the rationale for ERCP, including risk/benefit discussion.  Specifically, gaining biliary drainage will improve biliary pain and reduce risk of bile duct infection and gallstone pancreatitis.  Discussed risks of ERCP including general risks of bleeding and bowel perforation, and specific risk of procedure-related pancreatitis.  Patient is agreeable to proceed with ERCP.

## 2024-01-17 NOTE — ANESTHESIA PROCEDURE NOTES
Airway  Urgency: elective    Date/Time: 1/16/2024 7:26 PM  Airway not difficult    General Information and Staff    Patient location during procedure: OR  Anesthesiologist: Marques Ann MD    Indications and Patient Condition  Indications for airway management: airway protection    Preoxygenated: yes  MILS not maintained throughout  Mask difficulty assessment: 1 - vent by mask    Final Airway Details  Final airway type: endotracheal airway      Successful airway: ETT  Cuffed: yes   Successful intubation technique: video laryngoscopy  Endotracheal tube insertion site: oral  Blade: Whitney  Blade size: 3  ETT size (mm): 7.0  Cormack-Lehane Classification: grade I - full view of glottis  Placement verified by: chest auscultation and capnometry   Measured from: lips  ETT/EBT  to lips (cm): 21  Number of attempts at approach: 1  Assessment: lips, teeth, and gum same as pre-op and atraumatic intubation    Additional Comments  Negative epigastric sounds, Breath sound equal bilaterally with symmetric chest rise and fall

## 2024-01-17 NOTE — ANESTHESIA POSTPROCEDURE EVALUATION
Patient: Umu Queen    Procedure Summary       Date: 01/16/24 Room / Location:  [facility]    Anesthesia Start: 1922 Anesthesia Stop: 2048    Procedure: CHOLECYSTECTOMY LAPAROSCOPIC INTRAOPERATIVE CHOLANGIOGRAM (Abdomen) Diagnosis:     Surgeons: Delgado Guevara MD Provider: Marques Ann MD    Anesthesia Type: general ASA Status: 3            Anesthesia Type: general    Vitals  Vitals Value Taken Time   BP     Temp     Pulse 93 01/16/24 2048   Resp     SpO2 93 % 01/16/24 2048   Vitals shown include unfiled device data.        Post Anesthesia Care and Evaluation    Patient location during evaluation: PACU  Patient participation: complete - patient participated  Level of consciousness: awake and alert  Pain management: adequate    Airway patency: patent  Anesthetic complications: No anesthetic complications  PONV Status: none  Cardiovascular status: hemodynamically stable and acceptable  Respiratory status: nonlabored ventilation, acceptable and nasal cannula  Hydration status: acceptable

## 2024-01-17 NOTE — PLAN OF CARE
Goal Outcome Evaluation:                   Problem: Adult Inpatient Plan of Care  Goal: Plan of Care Review  Outcome: Ongoing, Progressing  Goal: Patient-Specific Goal (Individualized)  Outcome: Ongoing, Progressing  Goal: Absence of Hospital-Acquired Illness or Injury  Outcome: Ongoing, Progressing  Intervention: Identify and Manage Fall Risk  Recent Flowsheet Documentation  Taken 1/17/2024 0200 by Sophia Cordero RN  Safety Promotion/Fall Prevention:   activity supervised   assistive device/personal items within reach   clutter free environment maintained   lighting adjusted   nonskid shoes/slippers when out of bed   room organization consistent   safety round/check completed  Taken 1/17/2024 0000 by Sophia Cordero RN  Safety Promotion/Fall Prevention:   activity supervised   assistive device/personal items within reach   clutter free environment maintained   lighting adjusted   nonskid shoes/slippers when out of bed   room organization consistent   safety round/check completed  Taken 1/16/2024 2200 by Sophia Cordero RN  Safety Promotion/Fall Prevention:   activity supervised   assistive device/personal items within reach   clutter free environment maintained   lighting adjusted   nonskid shoes/slippers when out of bed   room organization consistent   safety round/check completed  Intervention: Prevent Skin Injury  Recent Flowsheet Documentation  Taken 1/17/2024 0200 by Sophia Cordero RN  Body Position: position changed independently  Taken 1/17/2024 0000 by Sophia Cordero RN  Body Position: position changed independently  Taken 1/16/2024 2200 by Sophia Cordero RN  Body Position: position changed independently  Skin Protection:   incontinence pads utilized   transparent dressing maintained   tubing/devices free from skin contact  Intervention: Prevent and Manage VTE (Venous Thromboembolism) Risk  Recent Flowsheet Documentation  Taken 1/17/2024 0200 by Sophia Cordero RN  Activity Management: activity encouraged  Taken  1/17/2024 0000 by Sophia Cordero RN  Activity Management: activity encouraged  Taken 1/16/2024 2200 by Sophia Cordero RN  Activity Management: activity encouraged  Range of Motion: active ROM (range of motion) encouraged  Intervention: Prevent Infection  Recent Flowsheet Documentation  Taken 1/17/2024 0200 by Sophia Cordero RN  Infection Prevention:   hand hygiene promoted   rest/sleep promoted   single patient room provided  Taken 1/17/2024 0000 by Sophia Cordero RN  Infection Prevention:   hand hygiene promoted   rest/sleep promoted   single patient room provided  Taken 1/16/2024 2200 by Sophia Cordero RN  Infection Prevention:   hand hygiene promoted   rest/sleep promoted   single patient room provided  Goal: Optimal Comfort and Wellbeing  Outcome: Ongoing, Progressing  Intervention: Provide Person-Centered Care  Recent Flowsheet Documentation  Taken 1/16/2024 2200 by Sophia Cordero RN  Trust Relationship/Rapport:   care explained   choices provided   emotional support provided   empathic listening provided   questions answered   questions encouraged  Goal: Readiness for Transition of Care  Outcome: Ongoing, Progressing     Problem: Pain Acute  Goal: Acceptable Pain Control and Functional Ability  Outcome: Ongoing, Progressing  Intervention: Prevent or Manage Pain  Recent Flowsheet Documentation  Taken 1/17/2024 0000 by Sophia Cordero RN  Sensory Stimulation Regulation:   care clustered   lighting decreased  Sleep/Rest Enhancement: awakenings minimized  Taken 1/16/2024 2200 by Sophia Cordero RN  Sensory Stimulation Regulation:   care clustered   lighting decreased  Sleep/Rest Enhancement: awakenings minimized  Medication Review/Management: medications reviewed  Intervention: Optimize Psychosocial Wellbeing  Recent Flowsheet Documentation  Taken 1/17/2024 0000 by Sophia Cordero RN  Supportive Measures: active listening utilized  Taken 1/16/2024 2200 by Sophia Cordero RN  Supportive Measures: active listening utilized

## 2024-01-17 NOTE — ANESTHESIA PREPROCEDURE EVALUATION
Anesthesia Evaluation     Patient summary reviewed and Nursing notes reviewed   history of anesthetic complications:  PONV  NPO Solid Status: > 8 hours  NPO Liquid Status: > 2 hours           Airway   Mallampati: II  TM distance: >3 FB  Neck ROM: full  No difficulty expected  Dental      Pulmonary    (-) asthma, shortness of breath, recent URI, sleep apnea, not a smoker  Cardiovascular     (+) hypertension  (-) past MI, dysrhythmias, angina, cardiac stents      Neuro/Psych  (+) seizures (stress induced pseudo seizures)  (-) CVA  GI/Hepatic/Renal/Endo    (+) obesity, liver disease history of elevated LFT  (-) no renal disease, diabetes, no thyroid disorder    Musculoskeletal     Abdominal    Substance History      OB/GYN    (-)  Pregnant and history of pregnancy induced hypertension        Other        ROS/Med Hx Other: No Nausea                 Anesthesia Plan    ASA 3     general     intravenous induction     Anesthetic plan, risks, benefits, and alternatives have been provided, discussed and informed consent has been obtained with: patient.    Plan discussed with CRNA.      CODE STATUS:    Level Of Support Discussed With: Patient  Code Status (Patient has no pulse and is not breathing): CPR (Attempt to Resuscitate)  Medical Interventions (Patient has pulse or is breathing): Full

## 2024-01-18 ENCOUNTER — ANESTHESIA EVENT (OUTPATIENT)
Dept: GASTROENTEROLOGY | Facility: HOSPITAL | Age: 34
End: 2024-01-18

## 2024-01-18 ENCOUNTER — ANESTHESIA (OUTPATIENT)
Dept: GASTROENTEROLOGY | Facility: HOSPITAL | Age: 34
End: 2024-01-18

## 2024-01-18 ENCOUNTER — APPOINTMENT (OUTPATIENT)
Dept: GENERAL RADIOLOGY | Facility: HOSPITAL | Age: 34
End: 2024-01-18

## 2024-01-18 VITALS
OXYGEN SATURATION: 95 % | SYSTOLIC BLOOD PRESSURE: 126 MMHG | WEIGHT: 249 LBS | RESPIRATION RATE: 20 BRPM | BODY MASS INDEX: 42.51 KG/M2 | TEMPERATURE: 98.6 F | HEART RATE: 87 BPM | DIASTOLIC BLOOD PRESSURE: 81 MMHG | HEIGHT: 64 IN

## 2024-01-18 LAB
ALBUMIN SERPL-MCNC: 3.5 G/DL (ref 3.5–5.2)
ALBUMIN/GLOB SERPL: 1.3 G/DL
ALP SERPL-CCNC: 228 U/L (ref 39–117)
ALT SERPL W P-5'-P-CCNC: 297 U/L (ref 1–33)
ANION GAP SERPL CALCULATED.3IONS-SCNC: 11 MMOL/L (ref 5–15)
AST SERPL-CCNC: 84 U/L (ref 1–32)
BILIRUB SERPL-MCNC: 1.7 MG/DL (ref 0–1.2)
BUN SERPL-MCNC: 8 MG/DL (ref 6–20)
BUN/CREAT SERPL: 11 (ref 7–25)
CALCIUM SPEC-SCNC: 8.6 MG/DL (ref 8.6–10.5)
CHLORIDE SERPL-SCNC: 107 MMOL/L (ref 98–107)
CO2 SERPL-SCNC: 20 MMOL/L (ref 22–29)
CREAT SERPL-MCNC: 0.73 MG/DL (ref 0.57–1)
CYTO UR: NORMAL
DEPRECATED RDW RBC AUTO: 46.5 FL (ref 37–54)
EGFRCR SERPLBLD CKD-EPI 2021: 112.9 ML/MIN/1.73
ERYTHROCYTE [DISTWIDTH] IN BLOOD BY AUTOMATED COUNT: 13.8 % (ref 12.3–15.4)
GLOBULIN UR ELPH-MCNC: 2.8 GM/DL
GLUCOSE SERPL-MCNC: 101 MG/DL (ref 65–99)
HCT VFR BLD AUTO: 34.8 % (ref 34–46.6)
HGB BLD-MCNC: 11.1 G/DL (ref 12–15.9)
LAB AP CASE REPORT: NORMAL
LAB AP CLINICAL INFORMATION: NORMAL
MCH RBC QN AUTO: 29.1 PG (ref 26.6–33)
MCHC RBC AUTO-ENTMCNC: 31.9 G/DL (ref 31.5–35.7)
MCV RBC AUTO: 91.3 FL (ref 79–97)
PATH REPORT.FINAL DX SPEC: NORMAL
PATH REPORT.GROSS SPEC: NORMAL
PLATELET # BLD AUTO: 284 10*3/MM3 (ref 140–450)
PMV BLD AUTO: 10.9 FL (ref 6–12)
POTASSIUM SERPL-SCNC: 3.8 MMOL/L (ref 3.5–5.2)
PROT SERPL-MCNC: 6.3 G/DL (ref 6–8.5)
RBC # BLD AUTO: 3.81 10*6/MM3 (ref 3.77–5.28)
SODIUM SERPL-SCNC: 138 MMOL/L (ref 136–145)
WBC NRBC COR # BLD AUTO: 8.97 10*3/MM3 (ref 3.4–10.8)

## 2024-01-18 PROCEDURE — 25810000003 LACTATED RINGERS PER 1000 ML

## 2024-01-18 PROCEDURE — 25010000002 PROPOFOL 10 MG/ML EMULSION

## 2024-01-18 PROCEDURE — 80053 COMPREHEN METABOLIC PANEL: CPT | Performed by: INTERNAL MEDICINE

## 2024-01-18 PROCEDURE — 85027 COMPLETE CBC AUTOMATED: CPT | Performed by: INTERNAL MEDICINE

## 2024-01-18 PROCEDURE — 25810000003 LACTATED RINGERS PER 1000 ML: Performed by: INTERNAL MEDICINE

## 2024-01-18 PROCEDURE — 99239 HOSP IP/OBS DSCHRG MGMT >30: CPT | Performed by: INTERNAL MEDICINE

## 2024-01-18 PROCEDURE — 0FPD8DZ REMOVAL OF INTRALUMINAL DEVICE FROM PANCREATIC DUCT, VIA NATURAL OR ARTIFICIAL OPENING ENDOSCOPIC: ICD-10-PCS | Performed by: INTERNAL MEDICINE

## 2024-01-18 PROCEDURE — 74018 RADEX ABDOMEN 1 VIEW: CPT

## 2024-01-18 PROCEDURE — 43247 EGD REMOVE FOREIGN BODY: CPT | Performed by: INTERNAL MEDICINE

## 2024-01-18 PROCEDURE — 25010000002 LIDOCAINE 1 % SOLUTION

## 2024-01-18 RX ORDER — LIDOCAINE HYDROCHLORIDE 10 MG/ML
INJECTION, SOLUTION INFILTRATION; PERINEURAL AS NEEDED
Status: DISCONTINUED | OUTPATIENT
Start: 2024-01-18 | End: 2024-01-18 | Stop reason: SURG

## 2024-01-18 RX ORDER — ONDANSETRON 4 MG/1
4 TABLET, FILM COATED ORAL EVERY 8 HOURS PRN
Qty: 12 TABLET | Refills: 0 | Status: SHIPPED | OUTPATIENT
Start: 2024-01-18

## 2024-01-18 RX ORDER — ONDANSETRON 2 MG/ML
4 INJECTION INTRAMUSCULAR; INTRAVENOUS ONCE AS NEEDED
Status: DISCONTINUED | OUTPATIENT
Start: 2024-01-18 | End: 2024-01-18 | Stop reason: HOSPADM

## 2024-01-18 RX ORDER — PROPOFOL 10 MG/ML
VIAL (ML) INTRAVENOUS AS NEEDED
Status: DISCONTINUED | OUTPATIENT
Start: 2024-01-18 | End: 2024-01-18 | Stop reason: SURG

## 2024-01-18 RX ORDER — IPRATROPIUM BROMIDE AND ALBUTEROL SULFATE 2.5; .5 MG/3ML; MG/3ML
3 SOLUTION RESPIRATORY (INHALATION) ONCE AS NEEDED
Status: DISCONTINUED | OUTPATIENT
Start: 2024-01-18 | End: 2024-01-18 | Stop reason: HOSPADM

## 2024-01-18 RX ORDER — NALOXONE HCL 0.4 MG/ML
0.4 VIAL (ML) INJECTION
Status: DISCONTINUED | OUTPATIENT
Start: 2024-01-18 | End: 2024-01-18 | Stop reason: HOSPADM

## 2024-01-18 RX ORDER — MORPHINE SULFATE 2 MG/ML
1 INJECTION, SOLUTION INTRAMUSCULAR; INTRAVENOUS
Status: DISCONTINUED | OUTPATIENT
Start: 2024-01-18 | End: 2024-01-18 | Stop reason: HOSPADM

## 2024-01-18 RX ORDER — SODIUM CHLORIDE, SODIUM LACTATE, POTASSIUM CHLORIDE, CALCIUM CHLORIDE 600; 310; 30; 20 MG/100ML; MG/100ML; MG/100ML; MG/100ML
INJECTION, SOLUTION INTRAVENOUS CONTINUOUS PRN
Status: DISCONTINUED | OUTPATIENT
Start: 2024-01-18 | End: 2024-01-18 | Stop reason: SURG

## 2024-01-18 RX ORDER — OXYCODONE HYDROCHLORIDE AND ACETAMINOPHEN 5; 325 MG/1; MG/1
1 TABLET ORAL EVERY 6 HOURS PRN
Qty: 10 TABLET | Refills: 0 | Status: SHIPPED | OUTPATIENT
Start: 2024-01-18

## 2024-01-18 RX ADMIN — OXYCODONE AND ACETAMINOPHEN 1 TABLET: 5; 325 TABLET ORAL at 15:30

## 2024-01-18 RX ADMIN — PROPOFOL 30 MG: 10 INJECTION, EMULSION INTRAVENOUS at 14:14

## 2024-01-18 RX ADMIN — SODIUM CHLORIDE, POTASSIUM CHLORIDE, SODIUM LACTATE AND CALCIUM CHLORIDE 50 ML/HR: 600; 310; 30; 20 INJECTION, SOLUTION INTRAVENOUS at 13:52

## 2024-01-18 RX ADMIN — PROPOFOL 90 MG: 10 INJECTION, EMULSION INTRAVENOUS at 14:06

## 2024-01-18 RX ADMIN — OXYCODONE AND ACETAMINOPHEN 1 TABLET: 5; 325 TABLET ORAL at 10:34

## 2024-01-18 RX ADMIN — Medication 10 ML: at 08:41

## 2024-01-18 RX ADMIN — SODIUM CHLORIDE, POTASSIUM CHLORIDE, SODIUM LACTATE AND CALCIUM CHLORIDE: 600; 310; 30; 20 INJECTION, SOLUTION INTRAVENOUS at 14:01

## 2024-01-18 RX ADMIN — PROPOFOL 50 MG: 10 INJECTION, EMULSION INTRAVENOUS at 14:10

## 2024-01-18 RX ADMIN — LIDOCAINE HYDROCHLORIDE 100 MG: 10 INJECTION, SOLUTION INFILTRATION; PERINEURAL at 14:06

## 2024-01-18 NOTE — DISCHARGE INSTRUCTIONS
Use alternative birth control methods for next 7 days    No lifting more than 15 pounds for next few weeks    Follow-up with providers as scheduled

## 2024-01-18 NOTE — ANESTHESIA PREPROCEDURE EVALUATION
Anesthesia Evaluation     Patient summary reviewed and Nursing notes reviewed   history of anesthetic complications:  PONV  NPO Solid Status: > 8 hours  NPO Liquid Status: > 2 hours           Airway   Mallampati: II  TM distance: >3 FB  Neck ROM: full  No difficulty expected  Dental      Pulmonary    (-) asthma, shortness of breath, recent URI, sleep apnea, not a smoker  Cardiovascular     (+) hypertension  (-) dysrhythmias, angina, cardiac stents      Neuro/Psych  (-) seizures (pseudo seizures), CVA  GI/Hepatic/Renal/Endo    (+) obesity, morbid obesity, liver disease history of elevated LFT  (-) no renal disease, diabetes, no thyroid disorder    Musculoskeletal     Abdominal    Substance History      OB/GYN    (-)  Pregnant (neg HCG) and history of pregnancy induced hypertension        Other        ROS/Med Hx Other: Soup at 0800 3 spoonfulls   No nausea       Phys Exam Other: Mac 3 grade 1 view GB surgery yesterday                   Anesthesia Plan    ASA 3     general     intravenous induction     Anesthetic plan, risks, benefits, and alternatives have been provided, discussed and informed consent has been obtained with: patient.    Plan discussed with CRNA.        CODE STATUS:    Level Of Support Discussed With: Patient  Code Status (Patient has no pulse and is not breathing): CPR (Attempt to Resuscitate)  Medical Interventions (Patient has pulse or is breathing): Full

## 2024-01-18 NOTE — NURSING NOTE
Patient returned from endo, patient AxO4, patient has no c/o pain or discomfort, dc order in progress, waiting on family and pharmacy, care ongoing TGS RN

## 2024-01-18 NOTE — PLAN OF CARE
Problem: Adult Inpatient Plan of Care  Goal: Plan of Care Review  Outcome: Met  Flowsheets (Taken 1/18/2024 1515)  Progress: improving  Plan of Care Reviewed With: patient  Goal: Patient-Specific Goal (Individualized)  Outcome: Met  Goal: Absence of Hospital-Acquired Illness or Injury  Outcome: Met  Intervention: Identify and Manage Fall Risk  Recent Flowsheet Documentation  Taken 1/18/2024 0800 by Rose Mary Segovia RN  Safety Promotion/Fall Prevention:   activity supervised   assistive device/personal items within reach   clutter free environment maintained   fall prevention program maintained   lighting adjusted   mobility aid in reach   nonskid shoes/slippers when out of bed   room organization consistent   safety round/check completed  Intervention: Prevent Skin Injury  Recent Flowsheet Documentation  Taken 1/18/2024 0800 by Rose Mary eSgovia RN  Body Position: position changed independently  Intervention: Prevent and Manage VTE (Venous Thromboembolism) Risk  Recent Flowsheet Documentation  Taken 1/18/2024 0800 by Rose Mary Segovia RN  Activity Management: activity encouraged  VTE Prevention/Management:   bilateral   sequential compression devices on  Range of Motion: active ROM (range of motion) encouraged  Intervention: Prevent Infection  Recent Flowsheet Documentation  Taken 1/18/2024 0800 by Rose Mary Segovia RN  Infection Prevention:   rest/sleep promoted   single patient room provided  Goal: Optimal Comfort and Wellbeing  Outcome: Met  Intervention: Monitor Pain and Promote Comfort  Recent Flowsheet Documentation  Taken 1/18/2024 1034 by Rose Mary Segovia RN  Pain Management Interventions:   see MAR   relaxation techniques promoted  Intervention: Provide Person-Centered Care  Recent Flowsheet Documentation  Taken 1/18/2024 0800 by Rose Mary Segovia RN  Trust Relationship/Rapport:   care explained   questions answered   questions encouraged   reassurance provided   thoughts/feelings acknowledged  Goal:  Readiness for Transition of Care  Outcome: Met   Goal Outcome Evaluation:  Plan of Care Reviewed With: patient        Progress: improving

## 2024-01-18 NOTE — NURSING NOTE
Patient given discharge instructions, PIV previously removed from left hand, patient has medication from pharmacy, spouse here to drive patient home, belongings with patient, instructions per discharge sheet, patient verbalized understanding of discharge instructions, taken down by wheelchair to vehicle, TGS RN

## 2024-01-18 NOTE — PLAN OF CARE
Problem: Adult Inpatient Plan of Care  Goal: Plan of Care Review  Outcome: Met  Flowsheets (Taken 1/18/2024 1515)  Progress: improving  Plan of Care Reviewed With: patient  Goal: Patient-Specific Goal (Individualized)  Outcome: Met  Goal: Absence of Hospital-Acquired Illness or Injury  Outcome: Met  Intervention: Identify and Manage Fall Risk  Recent Flowsheet Documentation  Taken 1/18/2024 0800 by Rose Mary Segovia RN  Safety Promotion/Fall Prevention:   activity supervised   assistive device/personal items within reach   clutter free environment maintained   fall prevention program maintained   lighting adjusted   mobility aid in reach   nonskid shoes/slippers when out of bed   room organization consistent   safety round/check completed  Intervention: Prevent Skin Injury  Recent Flowsheet Documentation  Taken 1/18/2024 0800 by Rose Mary Segovia RN  Body Position: position changed independently  Intervention: Prevent and Manage VTE (Venous Thromboembolism) Risk  Recent Flowsheet Documentation  Taken 1/18/2024 0800 by Rose Mary Segovia RN  Activity Management: activity encouraged  VTE Prevention/Management:   bilateral   sequential compression devices on  Range of Motion: active ROM (range of motion) encouraged  Intervention: Prevent Infection  Recent Flowsheet Documentation  Taken 1/18/2024 0800 by Rose Mary Segovia RN  Infection Prevention:   rest/sleep promoted   single patient room provided  Goal: Optimal Comfort and Wellbeing  Outcome: Met  Intervention: Monitor Pain and Promote Comfort  Recent Flowsheet Documentation  Taken 1/18/2024 1034 by Rose Mary Segovia RN  Pain Management Interventions:   see MAR   relaxation techniques promoted  Intervention: Provide Person-Centered Care  Recent Flowsheet Documentation  Taken 1/18/2024 0800 by Rose Mary Segovia RN  Trust Relationship/Rapport:   care explained   questions answered   questions encouraged   reassurance provided   thoughts/feelings acknowledged  Goal:  Readiness for Transition of Care  Outcome: Met   Goal Outcome Evaluation:  Plan of Care Reviewed With: patient        Progress: improving

## 2024-01-18 NOTE — BRIEF OP NOTE
ESOPHAGOGASTRODUODENOSCOPY  Progress Note    Umu Queen  1/18/2024    PD stent removed without difficulty.    Stable for discharge from GI standpoint.    Mark I. Brunner, MD     Date: 1/18/2024  Time: 14:24 EST

## 2024-01-18 NOTE — PROGRESS NOTES
"Patient Name:  Umu Queen  YOB: 1990  [id number]    Surgery Progress Note    Date of visit: 1/18/2024    Subjective   Pain significantly improved from yesterday.  No nausea or vomiting.  No fevers or chills.  Had a loose bowel movement.         Objective       /71 (BP Location: Right arm, Patient Position: Lying)   Pulse 102   Temp 97.5 °F (36.4 °C) (Oral)   Resp 16   Ht 162.6 cm (64.02\")   Wt 113 kg (249 lb)   LMP 12/07/2023 Comment: 10/27/23 baby  SpO2 94%   Breastfeeding Yes   BMI 42.72 kg/m²     Intake/Output Summary (Last 24 hours) at 1/18/2024 0922  Last data filed at 1/18/2024 0600  Gross per 24 hour   Intake 560 ml   Output 1350 ml   Net -790 ml       CV:  Rhythm regular and rate regular  L:  Clear to auscultation bilaterally  Abd:  Bowel sounds positive, soft, appropriately tender, incisions clean dry and intact  Ext:  No cyanosis, clubbing, edema    Recent labs and imaging that are back at this time have been reviewed.   WBC 9  Hemoglobin 11.1  Creatinine 0.73   -> 84   -> 297  Total bilirubin 4.6 -> 1.7       Assessment & Plan     Patient postoperative day 2 from laparoscopic cholecystectomy with intraoperative cholangiogram and now postprocedure day 1 from ERCP with removal of common bile duct stones.  Advance diet as tolerated.  Pain control, incentive spirometer, DVT prophylaxis.  Okay for DC planning from surgical perspective.  No antibiotics needed at discharge.  Can follow with me in 2-3 weeks.        Delgado Guevara MD  1/18/2024  09:22 EST     "

## 2024-01-18 NOTE — CASE MANAGEMENT/SOCIAL WORK
Continued Stay Note  [facility]     Patient Name: Umu Queen  MRN: [id number]  Today's Date: 1/18/2024    Admit Date: 1/15/2024    Plan: home with family   Discharge Plan       Row Name 01/18/24 1220       Plan    Plan home with family    Patient/Family in Agreement with Plan yes    Plan Comments CM met with the patient at the bedside. She is planning to discharge home as soon as possible. She denied any needs from CM today. She confirmed that her spouse will transport her home when medically ready. CM following.    Final Discharge Disposition Code 01 - home or self-care                   Discharge Codes    No documentation.                 Expected Discharge Date and Time       Expected Discharge Date Expected Discharge Time    Jan 18, 2024               Yakelin Lopez RN

## 2024-01-18 NOTE — PROGRESS NOTES
Clinical Nutrition   Nutrition Assessment  Reason for Visit: MST score 2+, NPO/Clear liquid, Unintentional weight loss, Reduced oral intake    Patient Name: Umu Queen  YOB: 1990  MRN: [id number]  Date of Encounter: 24 08:38 EST  Admission date: 1/15/2024    Comments:    Pt identified NPO/Clear Liquid Diet >72 hrs. Advance diet as medically appropriate. RD to monitor for diet advance. Please consult RD if nutrition support indicated.        Pt does not meet criteria for malnutrition at this time - although noted to have lost 20# x 3months, pt is now 10wk post partum.    Nutrition Assessment   Admission Diagnosis:  Dilation of common bile duct [K83.8]    Problem List:    Dilation of common bile duct    Porcelain gallbladder    Elevated LFTs      PMH:   She  has a past medical history of Anemia, Anesthesia complication, Anxiety, Depression, Gestational hypertension, Ovarian cyst, PONV (postoperative nausea and vomiting), and Seizures.    PSH:  She  has a past surgical history that includes  section; Hemorrhoid surgery;  section (N/A, 10/27/2023); and cholecystectomy with intraoperative cholangiogram (N/A, 2024).    Applicable Nutrition Concerns:   Skin:  Oral:  GI:    Applicable Interval History:   ) s/p lap CCY  ) ERCP     Reported/Observed/Food/Nutrition Related History:     Pt laying in bed, reports feeling much better s/p CCY just feeling sore. Able to eat some broth on CLD but endorsed diarrhea following. Brief education on post CCY recommendations - discussed how the gall bladder functioned and changes post-op, encouraged a low fat diet post-op and reintroduce as able. Pt verbalized understanding. Denies unintentional weight loss - 10 months post partum. Denies dysphagia or known food allergies. Hopeful to go home following ERCP.      Anthropometrics     Flowsheet Rows      Flowsheet Row First Filed Value   Admission Height 162.6 cm  "(64\") Documented at 01/15/2024 1733   Admission Weight 116 kg (255 lb) Documented at 01/15/2024 1733            Height: Height: 162.6 cm (64.02\")  Last Filed Weight: Weight: 113 kg (249 lb) (01/16/24 1729)  Method: Weight Method: Stated  BMI: BMI (Calculated): 42.7  BMI classification: Obese Class III extreme obesity: > or equal to 40kg/m2  IBW:   120lbs    UBW:     Weight      Weight (kg) Weight (lbs) Weight Method   10/24/2023 122.35 kg  269 lb 11.7 oz  Standing scale    10/27/2023 122.018 kg  269 lb  Stated    1/15/2024 115.667 kg  255 lb  Stated    1/16/2024 112.946 kg  249 lb  Stated     112.946 kg  249 lb       Weight change:  unable to determine true weight changes as pt is 10wks postpartum    Nutrition Focused Physical Exam     Date:  1/18       Pt does not meet criteria for malnutrition diagnosis, at this time.      Current Nutrition Prescription   PO: NPO Diet NPO Type: Strict NPO  Oral Nutrition Supplement: N/A  Intake:  50% x1 meal (CLD) documented    Nutrition Diagnosis   Date:  1/18            Updated:    Problem Inadequate energy intake    Etiology POD#2 s/p CCY with plan for ERCP   Signs/Symptoms NPO/CLDx3   Status: New    Goal:   General: Nutrition to support treatment  PO: Advace diet as medically feasible/appropriate  EN/PN: N/A    Nutrition Intervention      Follow treatment progress, Care plan reviewed, Await begin PO        Monitoring/Evaluation:   Per protocol, I&O, Pertinent labs, GI status, POC/GOC      Jazmin Brumfield, MS,RD,LD  Time Spent: 25min    "

## 2024-01-18 NOTE — ANESTHESIA POSTPROCEDURE EVALUATION
Patient: Umu Queen    Procedure Summary       Date: 01/18/24 Room / Location:  [facility]    Anesthesia Start: 1401 Anesthesia Stop: 1422    Procedure: ESOPHAGOGASTRODUODENOSCOPY for Pancreatic Duct  stent removal Diagnosis:     Surgeons: Brunner, Mark I, MD Provider: Ben Hull MD    Anesthesia Type: general ASA Status: 3            Anesthesia Type: general    Vitals  Vitals Value Taken Time   BP 90/48 01/18/24 1422   Temp 97.2 °F (36.2 °C) 01/18/24 1422   Pulse 94 01/18/24 1422   Resp 14 01/18/24 1422   SpO2 96 % 01/18/24 1422           Post Anesthesia Care and Evaluation    Patient location during evaluation: PACU  Patient participation: complete - patient participated  Level of consciousness: awake and alert  Pain score: 0  Pain management: adequate    Airway patency: patent  Anesthetic complications: No anesthetic complications  PONV Status: none  Cardiovascular status: hemodynamically stable and acceptable  Respiratory status: nonlabored ventilation, acceptable and nasal cannula  Hydration status: acceptable

## 2024-01-18 NOTE — PLAN OF CARE
Goal Outcome Evaluation:  Plan of Care Reviewed With: patient       Alert and oriented. VSS. RA. Ambulated in hallway before bed. Positive for simple sepsis on screen, notified APRN, no orders. C/O diarrhea. Good urine output. Coughing, using spirometer and splint pillow. Only given tylenol once.         Progress: improving       Problem: Adult Inpatient Plan of Care  Goal: Plan of Care Review  Outcome: Ongoing, Progressing  Flowsheets (Taken 1/17/2024 2207)  Progress: improving  Plan of Care Reviewed With: patient  Goal: Patient-Specific Goal (Individualized)  Outcome: Ongoing, Progressing  Goal: Absence of Hospital-Acquired Illness or Injury  Outcome: Ongoing, Progressing  Intervention: Identify and Manage Fall Risk  Recent Flowsheet Documentation  Taken 1/17/2024 2000 by Dana Kennedy RN  Safety Promotion/Fall Prevention: safety round/check completed  Intervention: Prevent Skin Injury  Recent Flowsheet Documentation  Taken 1/17/2024 2000 by Dana Kennedy RN  Body Position: position changed independently  Skin Protection: adhesive use limited  Taken 1/17/2024 1949 by Dana Kennedy RN  Body Position: position changed independently  Intervention: Prevent and Manage VTE (Venous Thromboembolism) Risk  Recent Flowsheet Documentation  Taken 1/17/2024 2000 by Dana Kennedy RN  Activity Management: activity encouraged  VTE Prevention/Management:   bilateral   sequential compression devices on  Taken 1/17/2024 1949 by Dana Kennedy RN  Activity Management: ambulated outside room  Goal: Optimal Comfort and Wellbeing  Outcome: Ongoing, Progressing  Intervention: Provide Person-Centered Care  Recent Flowsheet Documentation  Taken 1/17/2024 2000 by Dana Kennedy RN  Trust Relationship/Rapport:   care explained   choices provided  Goal: Readiness for Transition of Care  Outcome: Ongoing, Progressing     Problem: Adjustment to Illness (Sepsis/Septic Shock)  Goal: Optimal Coping  Outcome: Ongoing,  Progressing  Intervention: Optimize Psychosocial Adjustment to Illness  Recent Flowsheet Documentation  Taken 1/17/2024 2000 by Dana Kennedy, RN  Family/Support System Care: support provided

## 2024-01-18 NOTE — DISCHARGE SUMMARY
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      Cholelithiasis w/ porcelain gallbladder  Choledocholithiasis w/ Elevated LFT's and bilirubin (improved after ERCP)  -s/p lap ccy 24; intraopcholangiogram was abnormal  -s/p ERCP w/ pancreatic duct stent, sphincterotomy w/ balloon sweeps (2 stones and sludge removed)  -typically keep pancreatic stent in 1 week, but as patient without insurance GI performed endoscopy w/ pancreatic duct stent removal on 24 prior to discharge home  -follow up Dr. Guevara (GI) 2 weeks; no antibiotics at discharge per Gen Surgery  Ovarian dermoid  -ct a/p showed 3.1cm right ovarian dermoid; annual surveillance recommended (defer to pcp/ob)  10 weeks post-partum  -instructed to \"pump and dump\" since getting prn percocet, unless cleared by OB, patient voiced understanding    Hospital Course:  Umu Queen is a 33 y.o. female 10 week post-partum presented w/ ruq abd pain. Workup revealed gallstones and porcelain appearing gallbladder. Patient underwent lap ccy 24 w/ Dr. Guevara, intra-op cholangiogram was abnormal and GI consulted. GI performed ERCP w/ pancreatic duct stent placement, w/ sphincterotomy w/ balloon sweeps and removed 2 stones and sludge. Lft's improved. In " discussion w/ GI they typically have patient's w/ pancreatic duct stent follow up in a week for stent removal, but due to lack of insurance/logistical issues regarding follow up, GI performed endoscopy w/ pancreatic stent removal prior to discharge      Discharge Follow Up Recommendations for outpatient labs/diagnostics:   Follow up with Dr. Paola moss surgery 2-3 weeks  F/u pcp 1 week    Day of Discharge     HPI:   Feeling well today, tolerated diet last night. No dyspnea. Eager to go home    Review of Systems  No f/c    Vital Signs:   Temp:  [97 °F (36.1 °C)-99 °F (37.2 °C)] 97.2 °F (36.2 °C)  Heart Rate:  [] 92  Resp:  [14-20] 14  BP: ()/(48-81) 93/55  Flow (L/min):  [2] 2      Physical Exam:  Constitutional:Alert, oriented x 3, nontoxic appearing  Psych:Normal/appropriate affect  HEENT:NCAT, oropharynx clear  Neck: neck supple, full range of motion  Neuro: Face symmetric, speech clear, equal , moves all extremities  Cardiac: RRR; No pretibial pitting edema  Resp: CTAB, normal effort  GI: abd soft, nontender, expected mild post-op tenderness, no rebound or guarding  Skin: No extremity rash  Musculoskeletal/extremities: no cyanosis of extremities; no significant ankle edema          Pertinent  and/or Most Recent Results     LAB RESULTS:      Lab 01/18/24  0322 01/17/24  0331 01/16/24  0427 01/15/24  1856   WBC 8.97 15.44* 6.50 6.42   HEMOGLOBIN 11.1* 13.6 11.9* 13.1   HEMATOCRIT 34.8 42.7 36.7 40.0   PLATELETS 284 396 311 343   NEUTROS ABS  --   --  3.39 3.72   IMMATURE GRANS (ABS)  --   --  0.02 0.02   LYMPHS ABS  --   --  2.47 2.13   MONOS ABS  --   --  0.44 0.38   EOS ABS  --   --  0.17 0.15   MCV 91.3 92.8 90.0 90.1         Lab 01/18/24 0322 01/17/24  0331 01/16/24  0427 01/15/24  1833   SODIUM 138 135* 144 136   POTASSIUM 3.8 4.0 3.6 3.9   CHLORIDE 107 100 109* 101   CO2 20.0* 20.0* 22.0 19.0*   ANION GAP 11.0 15.0 13.0 16.0*   BUN 8 9 7 8   CREATININE 0.73 0.73 0.61 0.64   EGFR 112.9 112.9  122.8 121.3   GLUCOSE 101* 72 89 92   CALCIUM 8.6 9.2 8.4* 9.0         Lab 01/18/24  0322 01/17/24  0331 01/16/24  0427 01/15/24  1833   TOTAL PROTEIN 6.3 7.4 6.3 7.8   ALBUMIN 3.5 4.1 3.6 4.3   GLOBULIN 2.8 3.3  --  3.5   ALT (SGPT) 297* 470* 528* 763*   AST (SGOT) 84* 152* 164* 305*   BILIRUBIN 1.7* 4.6* 4.1* 5.5*   INDIRECT BILIRUBIN  --   --  0.2  --    BILIRUBIN DIRECT  --   --  3.9*  --    ALK PHOS 228* 297* 237* 295*   LIPASE  --  18  --  37         Lab 01/15/24  1833   PROBNP 39.7   HSTROP T <6                 Brief Urine Lab Results  (Last result in the past 365 days)        Color   Clarity   Blood   Leuk Est   Nitrite   Protein   CREAT   Urine HCG        01/16/24 1733               Negative             Microbiology Results (last 10 days)       ** No results found for the last 240 hours. **            FL ERCP pancreatic and biliary ducts    Result Date: 1/18/2024  FL ERCP PANCREATIC AND BILIARY DUCTS Date of Exam: 1/17/2024 11:28 AM EST Indication: ERCP. Comparison: Intraoperative cholangiogram 1/16/2024 Technique:  A series of radiographic digital spot films were obtained in conjunction with a endoscopic catheterization of the biliary and pancreatic ductal system, performed by the gastroenterologist. Fluoroscopic Time: 2 minutes, 34 seconds Number of Images: 2 Findings: Intraprocedural fluoroscopic images during ERCP demonstrates cannulization of the dilated common bile duct with evidence of filling defect in the distal CBD with subsequent balloon sweep of the CBD.     Impression: Intraprocedural fluoroscopic images during ERCP demonstrates cannulization of the dilated common bile duct with evidence of filling defect in the distal CBD with subsequent balloon sweep of the CBD. Please for to ERCP procedure note for details. Electronically Signed: Brennen Martinez MD  1/18/2024 8:51 AM EST  Workstation ID: [id number]    XR Abdomen KUB    Result Date: 1/18/2024  XR ABDOMEN KUB Date of Exam: 1/18/2024 2:47 AM EST  Indication: Check for spontaneous passage of prophylactic pancreas duct stent. Comparison: None available. Findings: There is a pancreatic duct stent seen in the right upper abdomen. Clips from cholecystectomy are seen. There is some contrast within the colon. The gas pattern is nonspecific.     Impression: Pancreatic duct stent remains in place in the right upper quadrant. Electronically Signed: Wilmar Porras MD  1/18/2024 4:10 AM EST  Workstation ID: [id number]    FL Cholangiogram Operative    Result Date: 1/16/2024  FL CHOLANGIOGRAM OPERATIVE Date of Exam: 1/16/2024 7:38 PM EST Indication: IOC. Comparison: None available. Technique:  Digital spot images were obtained from an intraoperative cholangiogram procedure performed by the surgeon. Fluoroscopic Time: 23.9 seconds Findings: All images are severely degraded by motion artifact. Study is nondiagnostic     Impression: Nondiagnostic intraoperative cholangiogram secondary to motion Electronically Signed: Darren Billings MD  1/16/2024 9:06 PM EST  Workstation ID: [id number]    MRI abdomen wo contrast mrcp    Result Date: 1/16/2024  MRI ABDOMEN WO CONTRAST MRCP Date of Exam: 1/16/2024 12:43 AM EST Indication: Dilated CBD, jaundice, elevated LFTs.  Comparison: None available. Technique:  Routine multiplanar/multisequence images of the abdomen were obtained with MRCP sequences without contrast administration. Findings: MR Abdomen: The liver size and contour are normal without evidence of fibrosis.  No hepatic mass or steatosis.   There is conventional and patent hepatic arterial anatomy.  Patent portal and hepatic veins.  The SMV and splenic veins are patent.  No ascites or intraabdominal fluid collection. The pancreas enhances homogeneously. No peripancreatic fluid collection, pancreatic necrosis, or pancreatic pseudocyst.  The spleen is normal.  The adrenal glands are of normal size and morphology. The kidneys demonstrate no parenchymal mass or hydronephrosis. No  evidence of dilated bowel or obstruction. No abnormal areas of contrast enhancement. The abdominal aorta is normal in caliber without aneurysmal dilatation. The celiac, splenic, and superior mesenteric arteries are patent.  No lymphadenopathy. MRCP: There are several stones within the gallbladder. There is no evidence of pericholecystic fluid or gallbladder wall thickening. The intra and extrahepatic biliary tree appears unremarkable.  The common bile duct measures up to mm. No filling defects within the common bile duct to suggest choledocholithiasis.  The pancreatic duct is of normal diameter without evidence of dilation or stricture.  No evidence of pancreatic divisum.     Cholelithiasis. No convincing choledocholithiasis. No inflammatory change to indicate pancreatitis or acute cholecystitis. Electronically Signed: Wilmar Porras MD  1/16/2024 1:15 AM EST  Workstation ID: [id number]    US Gallbladder    Result Date: 1/15/2024  US GALLBLADDER Date of Exam: 1/15/2024 9:15 PM EST Indication: RUQ pain, jaundice. Comparison: CT of the abdomen and pelvis with contrast from 1/15/2024 Technique: Grayscale and color Doppler ultrasound evaluation of the right upper quadrant was performed. Findings: The liver is homogeneous in echotexture and normal in echogenicity. No focal liver masses are identified. The hepatic and portal veins are patent with normal directional flow. There are multiple tiny mobile shadowing gallstones in the dependent portions of the gallbladder. Better seen on the CT The nondependent portion of the gallbladder are calcifications indicative of porcelain gallbladder. No pericholecystic fluid or definitive sludge. The gallbladder wall is 3 mm in thickness. No intrahepatic biliary dilatation is seen although there is distention of the common duct to 1.0 cm. The calcification in the distal common duct clearly seen on the CT from earlier today is likely obscured by bowel gas and not definitively seen on the  ultrasound. The pancreas is obscured by bowel gas. The right kidney is 11.2 cm in [id number] length.Cortical thickness and echogenicity are preserved.No hydronephrosis or suspicious renal masses. No definitive shadowing stones. No fluid collections are seen in the right upper quadrant.The intrahepatic portions of the IVC are patent. The abdominal aorta is obscured by bowel gas.     Impression: 1.Cholelithiasis with porcelain gallbladder. The common duct is dilated to 1 cm. The calcification in the distal common duct clearly seen on the CT from earlier today is likely obscured by bowel gas and not definitively seen on the submitted sonographic images. 2.The pancreas and abdominal aorta are obscured by bowel gas. Electronically Signed: Jeremy Lui DO  1/15/2024 9:51 PM EST  Workstation ID: [id number]    CT Abdomen Pelvis With Contrast    Result Date: 1/15/2024  CT ABDOMEN PELVIS W CONTRAST Date of Exam: 1/15/2024 8:43 PM EST Indication: Abdominal pain, acute, nonlocalized RUQ pain, jaundice. Comparison: None available. Technique: Axial CT images were obtained of the abdomen and pelvis following the uneventful intravenous administration of Isovue-300, 85 mL. Reconstructed coronal and sagittal images were also obtained. Automated exposure control and iterative construction methods were used. Findings: Lung Bases: The visualized lung bases and lower mediastinal structures are unremarkable. Peritoneum: No free intraperitoneal air or fluid. Abdominal wall: Unremarkable. Liver: Liver is normal in size and contour. No focal lesions. Biliary/Gallbladder: The gallbladder is distended. Thin gallbladder wall calcifications as well as small gallstones are visualized. No pericolonic inflammatory changes are visualized. There is no intrahepatic biliary ductal dilatation. There is a distal CBD radiopaque calculus measuring 1.7 cm in craniocaudal length and 0.8 cm in width. There is dilatation of the CBD measuring 1 cm in width.  Pancreas: Pancreas is within normal limits. There is no evidence of pancreatic mass or peripancreatic fluid. Spleen: Spleen is normal in size and contour. Gastrointestinal/Mesentery: No evidence of bowel obstruction or gross inflammatory changes.The appendix appears within normal limits.  No mesenteric fluid collections identified. Adrenals: Adrenal glands are unremarkable. Kidneys: The kidneys are in anatomic position. No evidence of nephrolithiasis. No evidence of hydronephrosis or perinephric fat stranding. Bladder: The urinary bladder is unremarkable. Reproductive organs:  Right ovarian lesion measures 3.1 cm containing fat and calcium consistent with dermoid. The uterus and left ovary are unremarkable. Retroperitoneal/Lymph Nodes: No retroperitoneal adenopathy is identified. Vasculature: Unremarkable.   Bony Structures:  No acute fracture or aggressive lesions.     Impression: Cholelithiasis. There is also evidence of gallbladder wall calcifications compatible with porcelain gallbladder. The gallbladder is distended. Distal CBD calculus measures 1.7 x 0.8 cm. There is secondary dilatation of the CBD measuring 1 cm. There is no  intrahepatic biliary ductal dilatation. 3.1 cm right ovarian dermoid. Consider annual surveillance to ensure stability in size. Electronically Signed: Izaiah Gustafson MD  1/15/2024 9:01 PM EST  Workstation ID: [id number]    XR Chest 1 View    Result Date: 1/15/2024  XR CHEST 1 VW Date of Exam: 1/15/2024 5:45 PM EST Indication: SOA triage protocol Comparison: None available. Findings: There are no consolidations.No pleural effusion. There is no evidence of pneumothorax. The pulmonary vasculature appears within normal limits. The cardiac and mediastinal silhouette appear unremarkable. No acute osseous abnormality identified.     Impression: No radiographic evidence of acute chest process. Electronically Signed: Izaiah Gustafson MD  1/15/2024 6:05 PM EST  Workstation ID: [id number]                  Plan for Follow-up of Pending Labs/Results:       Discharge Details        Discharge Medications        New Medications        Instructions Start Date   ondansetron 4 MG tablet  Commonly known as: Zofran   4 mg, Oral, Every 8 Hours PRN      oxyCODONE-acetaminophen 5-325 MG per tablet  Commonly known as: PERCOCET   1 tablet, Oral, Every 6 Hours PRN      PHARMACY MEDS TO BED CONSULT   Does not apply, Daily             Continue These Medications        Instructions Start Date   docusate sodium 100 MG capsule   100 mg, Oral, 2 Times Daily PRN      ibuprofen 600 MG tablet  Commonly known as: ADVIL,MOTRIN   600 mg, Oral, Every 6 Hours      Prenatal 27-1 27-1 MG tablet tablet   Oral, Daily               No Known Allergies      Discharge Disposition:  Home or Self Care    Diet:  Hospital:  Diet Order   Procedures    NPO Diet NPO Type: Strict NPO    Diet: Regular/House Diet; Texture: Regular Texture (IDDSI 7); Fluid Consistency: Thin (IDDSI 0)            Activity:             CODE STATUS:    Code Status and Medical Interventions:   Ordered at: 01/16/24 2150     Level Of Support Discussed With:    Patient     Code Status (Patient has no pulse and is not breathing):    CPR (Attempt to Resuscitate)     Medical Interventions (Patient has pulse or is breathing):    Full       Future Appointments   Date Time Provider Department Center   1/24/2024  2:00 PM Jaquelin Gómez APRN [facility]       Additional Instructions for the Follow-ups that You Need to Schedule       Discharge Follow-up with Specialty: Dr. Guevara (gen surgery) 2-3 weeks (post-op follow up); 2 Weeks   As directed      Specialty: Dr. Guevara (gen surgery) 2-3 weeks (post-op follow up)   Follow Up: 2 Weeks        Pcp 1 week                        Andrew Dixon MD  01/18/24      Time Spent on Discharge:  I spent  35  minutes on this discharge activity which included: face-to-face encounter with the patient, reviewing the data in the system,  coordination of the care with the nursing staff as well as consultants, documentation, and entering orders.

## 2024-01-21 LAB
QT INTERVAL: 386 MS
QTC INTERVAL: 442 MS

## 2025-04-15 ENCOUNTER — TRANSCRIBE ORDERS (OUTPATIENT)
Dept: ADMINISTRATIVE | Facility: HOSPITAL | Age: 35
End: 2025-04-15

## 2025-04-15 DIAGNOSIS — N64.52 NIPPLE DISCHARGE: Primary | ICD-10-CM

## 2025-05-10 LAB
NCCN CRITERIA FLAG: NORMAL
TYRER CUZICK SCORE: 11

## 2025-05-15 ENCOUNTER — HOSPITAL ENCOUNTER (OUTPATIENT)
Facility: HOSPITAL | Age: 35
Discharge: HOME | End: 2025-05-15
Payer: COMMERCIAL

## 2025-05-15 DIAGNOSIS — N64.52 NIPPLE DISCHARGE: ICD-10-CM

## 2025-05-15 PROCEDURE — G0279 TOMOSYNTHESIS, MAMMO: HCPCS

## 2025-05-15 PROCEDURE — 76642 ULTRASOUND BREAST LIMITED: CPT

## 2025-05-15 PROCEDURE — 77066 DX MAMMO INCL CAD BI: CPT

## 2025-06-17 ENCOUNTER — APPOINTMENT (OUTPATIENT)
Facility: HOSPITAL | Age: 35
End: 2025-06-17
Payer: COMMERCIAL

## 2025-06-17 ENCOUNTER — HOSPITAL ENCOUNTER (EMERGENCY)
Facility: HOSPITAL | Age: 35
Discharge: HOME | End: 2025-06-17
Attending: STUDENT IN AN ORGANIZED HEALTH CARE EDUCATION/TRAINING PROGRAM | Admitting: STUDENT IN AN ORGANIZED HEALTH CARE EDUCATION/TRAINING PROGRAM
Payer: COMMERCIAL

## 2025-06-17 VITALS
SYSTOLIC BLOOD PRESSURE: 128 MMHG | HEART RATE: 85 BPM | TEMPERATURE: 98.2 F | OXYGEN SATURATION: 97 % | RESPIRATION RATE: 20 BRPM | WEIGHT: 235.7 LBS | BODY MASS INDEX: 39.27 KG/M2 | HEIGHT: 65 IN | DIASTOLIC BLOOD PRESSURE: 77 MMHG

## 2025-06-17 DIAGNOSIS — N83.209 HEMORRHAGIC OVARIAN CYST: Primary | ICD-10-CM

## 2025-06-17 LAB
ALBUMIN SERPL-MCNC: 4.4 G/DL (ref 3.5–5.2)
ALBUMIN/GLOB SERPL: 1.5 G/DL
ALP SERPL-CCNC: 63 U/L (ref 39–117)
ALT SERPL W P-5'-P-CCNC: 17 U/L (ref 1–33)
ANION GAP SERPL CALCULATED.3IONS-SCNC: 10.6 MMOL/L (ref 5–15)
AST SERPL-CCNC: 20 U/L (ref 1–32)
BACTERIA UR QL AUTO: ABNORMAL /HPF
BASOPHILS # BLD AUTO: 0.02 10*3/MM3 (ref 0–0.2)
BASOPHILS NFR BLD AUTO: 0.2 % (ref 0–1.5)
BILIRUB SERPL-MCNC: 0.3 MG/DL (ref 0–1.2)
BILIRUB UR QL STRIP: NEGATIVE
BUN SERPL-MCNC: 12 MG/DL (ref 6–20)
BUN/CREAT SERPL: 17.6 (ref 7–25)
CALCIUM SPEC-SCNC: 9.6 MG/DL (ref 8.6–10.5)
CHLORIDE SERPL-SCNC: 108 MMOL/L (ref 98–107)
CLARITY UR: CLEAR
CO2 SERPL-SCNC: 20.4 MMOL/L (ref 22–29)
COLOR UR: YELLOW
CREAT SERPL-MCNC: 0.68 MG/DL (ref 0.57–1)
DEPRECATED RDW RBC AUTO: 41.4 FL (ref 37–54)
EGFRCR SERPLBLD CKD-EPI 2021: 118.8 ML/MIN/1.73
EOSINOPHIL # BLD AUTO: 0.09 10*3/MM3 (ref 0–0.4)
EOSINOPHIL NFR BLD AUTO: 1 % (ref 0.3–6.2)
ERYTHROCYTE [DISTWIDTH] IN BLOOD BY AUTOMATED COUNT: 13 % (ref 12.3–15.4)
GLOBULIN UR ELPH-MCNC: 2.9 GM/DL
GLUCOSE SERPL-MCNC: 88 MG/DL (ref 65–99)
GLUCOSE UR STRIP-MCNC: NEGATIVE MG/DL
HCG INTACT+B SERPL-ACNC: <0.2 MIU/ML
HCT VFR BLD AUTO: 40 % (ref 34–46.6)
HGB BLD-MCNC: 13.7 G/DL (ref 12–15.9)
HGB UR QL STRIP.AUTO: ABNORMAL
HOLD SPECIMEN: NORMAL
HYALINE CASTS UR QL AUTO: ABNORMAL /LPF
IMM GRANULOCYTES # BLD AUTO: 0.01 10*3/MM3 (ref 0–0.05)
IMM GRANULOCYTES NFR BLD AUTO: 0.1 % (ref 0–0.5)
KETONES UR QL STRIP: NEGATIVE
LEUKOCYTE ESTERASE UR QL STRIP.AUTO: NEGATIVE
LIPASE SERPL-CCNC: 28 U/L (ref 13–60)
LYMPHOCYTES # BLD AUTO: 3.45 10*3/MM3 (ref 0.7–3.1)
LYMPHOCYTES NFR BLD AUTO: 37.2 % (ref 19.6–45.3)
MCH RBC QN AUTO: 29.5 PG (ref 26.6–33)
MCHC RBC AUTO-ENTMCNC: 34.3 G/DL (ref 31.5–35.7)
MCV RBC AUTO: 86.2 FL (ref 79–97)
MONOCYTES # BLD AUTO: 0.56 10*3/MM3 (ref 0.1–0.9)
MONOCYTES NFR BLD AUTO: 6 % (ref 5–12)
NEUTROPHILS NFR BLD AUTO: 5.15 10*3/MM3 (ref 1.7–7)
NEUTROPHILS NFR BLD AUTO: 55.5 % (ref 42.7–76)
NITRITE UR QL STRIP: NEGATIVE
PH UR STRIP.AUTO: 6 [PH] (ref 5–8)
PLATELET # BLD AUTO: 320 10*3/MM3 (ref 140–450)
PMV BLD AUTO: 11.7 FL (ref 6–12)
POTASSIUM SERPL-SCNC: 3.6 MMOL/L (ref 3.5–5.2)
PROT SERPL-MCNC: 7.3 G/DL (ref 6–8.5)
PROT UR QL STRIP: NEGATIVE
RBC # BLD AUTO: 4.64 10*6/MM3 (ref 3.77–5.28)
RBC # UR STRIP: ABNORMAL /HPF
REF LAB TEST METHOD: ABNORMAL
SODIUM SERPL-SCNC: 139 MMOL/L (ref 136–145)
SP GR UR STRIP: 1.02 (ref 1–1.03)
SQUAMOUS #/AREA URNS HPF: ABNORMAL /HPF
UROBILINOGEN UR QL STRIP: ABNORMAL
WBC # UR STRIP: ABNORMAL /HPF
WBC NRBC COR # BLD AUTO: 9.28 10*3/MM3 (ref 3.4–10.8)
WHOLE BLOOD HOLD COAG: NORMAL
WHOLE BLOOD HOLD SPECIMEN: NORMAL

## 2025-06-17 PROCEDURE — 81001 URINALYSIS AUTO W/SCOPE: CPT | Performed by: STUDENT IN AN ORGANIZED HEALTH CARE EDUCATION/TRAINING PROGRAM

## 2025-06-17 PROCEDURE — 93976 VASCULAR STUDY: CPT

## 2025-06-17 PROCEDURE — 74176 CT ABD & PELVIS W/O CONTRAST: CPT

## 2025-06-17 PROCEDURE — 96374 THER/PROPH/DIAG INJ IV PUSH: CPT

## 2025-06-17 PROCEDURE — 99284 EMERGENCY DEPT VISIT MOD MDM: CPT | Performed by: STUDENT IN AN ORGANIZED HEALTH CARE EDUCATION/TRAINING PROGRAM

## 2025-06-17 PROCEDURE — 85025 COMPLETE CBC W/AUTO DIFF WBC: CPT | Performed by: STUDENT IN AN ORGANIZED HEALTH CARE EDUCATION/TRAINING PROGRAM

## 2025-06-17 PROCEDURE — 76830 TRANSVAGINAL US NON-OB: CPT

## 2025-06-17 PROCEDURE — 84702 CHORIONIC GONADOTROPIN TEST: CPT | Performed by: STUDENT IN AN ORGANIZED HEALTH CARE EDUCATION/TRAINING PROGRAM

## 2025-06-17 PROCEDURE — 25010000002 KETOROLAC TROMETHAMINE PER 15 MG: Performed by: STUDENT IN AN ORGANIZED HEALTH CARE EDUCATION/TRAINING PROGRAM

## 2025-06-17 PROCEDURE — 80053 COMPREHEN METABOLIC PANEL: CPT | Performed by: STUDENT IN AN ORGANIZED HEALTH CARE EDUCATION/TRAINING PROGRAM

## 2025-06-17 PROCEDURE — 83690 ASSAY OF LIPASE: CPT | Performed by: STUDENT IN AN ORGANIZED HEALTH CARE EDUCATION/TRAINING PROGRAM

## 2025-06-17 PROCEDURE — 36415 COLL VENOUS BLD VENIPUNCTURE: CPT

## 2025-06-17 RX ORDER — OXYCODONE HYDROCHLORIDE 5 MG/1
5 TABLET ORAL EVERY 6 HOURS PRN
Qty: 16 TABLET | Refills: 0 | Status: SHIPPED | OUTPATIENT
Start: 2025-06-17 | End: 2025-06-21

## 2025-06-17 RX ORDER — KETOROLAC TROMETHAMINE 15 MG/ML
15 INJECTION, SOLUTION INTRAMUSCULAR; INTRAVENOUS ONCE
Status: COMPLETED | OUTPATIENT
Start: 2025-06-17 | End: 2025-06-17

## 2025-06-17 RX ORDER — SODIUM CHLORIDE 9 MG/ML
10 INJECTION, SOLUTION INTRAMUSCULAR; INTRAVENOUS; SUBCUTANEOUS AS NEEDED
Status: DISCONTINUED | OUTPATIENT
Start: 2025-06-17 | End: 2025-06-18 | Stop reason: HOSPADM

## 2025-06-17 RX ADMIN — KETOROLAC TROMETHAMINE 15 MG: 15 INJECTION, SOLUTION INTRAMUSCULAR; INTRAVENOUS at 16:36

## 2025-06-17 NOTE — FSED PROVIDER NOTE
Subjective  History of Present Illness:      34 year old female hx of ureaplasma vaginal infection (currently on last/6th day of doxy) who presents with diffuse pelvic pain x 1 week. Pain is constant. She reports a three month history of post coital pain and continued vag bleeding. She denies fevers    Nurses Notes reviewed and agree, including vitals, allergies, social history and prior medical history.     REVIEW OF SYSTEMS: All systems reviewed and not pertinent unless noted.  Review of Systems   All other systems reviewed and are negative.      Past Medical History:   Diagnosis Date    Anemia     Anesthesia complication     difficult to wake up    Anxiety     Depression     Gestational hypertension     1st pregnancy    Ovarian cyst     right    PONV (postoperative nausea and vomiting)     Seizures     as a child, hasnt had any seizures in 10 years.       Allergies:    Patient has no known allergies.      Past Surgical History:   Procedure Laterality Date     SECTION       SECTION N/A 10/27/2023    Procedure:  SECTION REPEAT;  Surgeon: Emma Barrera MD;  Location:  [facility];  Service: Obstetrics;  Laterality: N/A;    CHOLECYSTECTOMY WITH INTRAOPERATIVE CHOLANGIOGRAM N/A 2024    Procedure: CHOLECYSTECTOMY LAPAROSCOPIC INTRAOPERATIVE CHOLANGIOGRAM;  Surgeon: Delgado Guevara MD;  Location:  [facility];  Service: General;  Laterality: N/A;    ENDOSCOPY N/A 2024    Procedure: ESOPHAGOGASTRODUODENOSCOPY;  Surgeon: Brunner, Mark I, MD;  Location:  [facility];  Service: Gastroenterology;  Laterality: N/A;    ERCP N/A 2024    Procedure: ENDOSCOPIC RETROGRADE CHOLANGIOPANCREATOGRAPHY WITH STENT INSERTION;  Surgeon: Brunner, Mark I, MD;  Location:  [facility];  Service: Gastroenterology;  Laterality: N/A;  STENT PLACED IN PACREATIC DUCT. SPHINCTEROTOMY MADE AND CBD SWEPT WITH 9-12MM BALLOON    HEMORRHOIDECTOMY           Social History     Socioeconomic  "History    Marital status:    Tobacco Use    Smoking status: Never     Passive exposure: Never    Smokeless tobacco: Never   Vaping Use    Vaping status: Never Used   Substance and Sexual Activity    Alcohol use: Not Currently    Drug use: Never    Sexual activity: Defer         Family History   Problem Relation Age of Onset    Breast cancer Neg Hx     Ovarian cancer Neg Hx        Objective  Physical Exam:  /77   Pulse 85   Temp 98.2 °F (36.8 °C) (Oral)   Resp 20   Ht 165.1 cm (65\")   Wt 107 kg (235 lb 11.2 oz)   SpO2 97%   Breastfeeding No   BMI 39.22 kg/m²      Physical Exam  Constitutional:       Appearance: Normal appearance.   HENT:      Head: Normocephalic and atraumatic.      Nose: Nose normal.      Mouth/Throat:      Mouth: Mucous membranes are moist.   Eyes:      Extraocular Movements: Extraocular movements intact.      Conjunctiva/sclera: Conjunctivae normal.   Cardiovascular:      Rate and Rhythm: Normal rate and regular rhythm.   Pulmonary:      Effort: Pulmonary effort is normal. No respiratory distress.   Abdominal:      General: Abdomen is flat. There is no distension.      Palpations: Abdomen is soft.      Tenderness: There is no abdominal tenderness. There is no guarding or rebound.      Comments: Atraumatic    Musculoskeletal:         General: Normal range of motion.      Cervical back: Normal range of motion and neck supple.   Skin:     General: Skin is warm and dry.   Neurological:      General: No focal deficit present.      Mental Status: She is alert.      Comments: Moving all extremities spontaneously    Psychiatric:         Mood and Affect: Mood normal.         Behavior: Behavior normal.         Procedures    ED Course:    ED Course as of 06/17/25 2245   e Jun 17, 2025 2237 Discussed the findings on the CT and ultrasound with the patient she is known about the dermoid cyst for some time actually had an ultrasound within the past 2 weeks to evaluate that developed the " pain in the interim.  Pain is well-controlled on reevaluation we will provide her pain control as well as return precautions she is going to follow-up back with her OB/GYN to consider reimaging for resolution. [JN]      ED Course User Index  [JN] Blayne Shell MD       Lab Results (last 24 hours)       Procedure Component Value Units Date/Time    Urinalysis With Microscopic If Indicated (No Culture) - Urine, Clean Catch [[id number]]  (Abnormal) Collected: 06/17/25 1608    Specimen: Urine, Clean Catch Updated: 06/17/25 1727     Color, UA Yellow     Appearance, UA Clear     pH, UA 6.0     Specific Gravity, UA 1.025     Glucose, UA Negative     Ketones, UA Negative     Bilirubin, UA Negative     Blood, UA Small (1+)     Protein, UA Negative     Leuk Esterase, UA Negative     Nitrite, UA Negative     Urobilinogen, UA 0.2 E.U./dL    Urinalysis, Microscopic Only - Urine, Clean Catch [[id number]]  (Abnormal) Collected: 06/17/25 1608    Specimen: Urine, Clean Catch Updated: 06/17/25 1747     RBC, UA 6-10 /HPF      WBC, UA 0-2 /HPF      Bacteria, UA None Seen /HPF      Squamous Epithelial Cells, UA 3-6 /HPF      Hyaline Casts, UA 3-6 /LPF      Methodology Manual Light Microscopy    CBC & Differential [[id number]]  (Abnormal) Collected: 06/17/25 1615    Specimen: Blood from Arm, Left Updated: 06/17/25 1642    Narrative:      The following orders were created for panel order CBC & Differential.  Procedure                               Abnormality         Status                     ---------                               -----------         ------                     CBC Auto Differential[[id number]]        Abnormal            Final result                 Please view results for these tests on the individual orders.    Comprehensive Metabolic Panel [[id number]]  (Abnormal) Collected: 06/17/25 1615    Specimen: Blood from Arm, Left Updated: 06/17/25 1646     Glucose 88 mg/dL      BUN 12.0 mg/dL      Creatinine 0.68 mg/dL       Sodium 139 mmol/L      Potassium 3.6 mmol/L      Chloride 108 mmol/L      CO2 20.4 mmol/L      Calcium 9.6 mg/dL      Total Protein 7.3 g/dL      Albumin 4.4 g/dL      ALT (SGPT) 17 U/L      AST (SGOT) 20 U/L      Alkaline Phosphatase 63 U/L      Total Bilirubin 0.3 mg/dL      Globulin 2.9 gm/dL      A/G Ratio 1.5 g/dL      BUN/Creatinine Ratio 17.6     Anion Gap 10.6 mmol/L      eGFR 118.8 mL/min/1.73     Narrative:      GFR Categories in Chronic Kidney Disease (CKD)              GFR Category          GFR (mL/min/1.73)    Interpretation  G1                    90 or greater        Normal or high (1)  G2                    60-89                Mild decrease (1)  G3a                   45-59                Mild to moderate decrease  G3b                   30-44                Moderate to severe decrease  G4                    15-29                Severe decrease  G5                    14 or less           Kidney failure    (1)In the absence of evidence of kidney disease, neither GFR category G1 or G2 fulfill the criteria for CKD.    eGFR calculation 2021 CKD-EPI creatinine equation, which does not include race as a factor    Lipase [[id number]]  (Normal) Collected: 06/17/25 1615    Specimen: Blood from Arm, Left Updated: 06/17/25 1645     Lipase 28 U/L     hCG, Quantitative, Pregnancy [[id number]] Collected: 06/17/25 1615    Specimen: Blood from Arm, Left Updated: 06/17/25 1654     HCG Quantitative <0.20 mIU/mL     Narrative:      HCG Ranges by Gestational Age    Females - non-pregnant premenopausal   </= 1mIU/mL HCG  Females - postmenopausal               </= 7mIU/mL HCG    3 Weeks         5.8 -    71.2 mIU/mL  4 Weeks         9.5 -     750 mIU/mL  5 Weeks         217 -   7,138 mIU/mL  6 Weeks         158 -  31,795 mIU/mL  7 Weeks       3,697 - 163,563 mIU/mL  8 Weeks      32,065 - 149,571 mIU/mL  9 Weeks      63,803 - 151,410 mIU/mL  10 Weeks     46,509 - 186,977 mIU/mL  12 Weeks     27,832 - 210,612 mIU/mL  14 Weeks      13,950 -  62,530 mIU/mL  15 Weeks     12,039 -  70,971 mIU/mL  16 Weeks      9,040 -  56,451 mIU/mL  17 Weeks      8,175 -  55,868 mIU/mL  18 Weeks      8,099 -  58,176 mIU/mL    CBC Auto Differential [[id number]]  (Abnormal) Collected: 06/17/25 1615    Specimen: Blood from Arm, Left Updated: 06/17/25 1642     WBC 9.28 10*3/mm3      RBC 4.64 10*6/mm3      Hemoglobin 13.7 g/dL      Hematocrit 40.0 %      MCV 86.2 fL      MCH 29.5 pg      MCHC 34.3 g/dL      RDW 13.0 %      RDW-SD 41.4 fl      MPV 11.7 fL      Platelets 320 10*3/mm3      Neutrophil % 55.5 %      Lymphocyte % 37.2 %      Monocyte % 6.0 %      Eosinophil % 1.0 %      Basophil % 0.2 %      Immature Grans % 0.1 %      Neutrophils, Absolute 5.15 10*3/mm3      Lymphocytes, Absolute 3.45 10*3/mm3      Monocytes, Absolute 0.56 10*3/mm3      Eosinophils, Absolute 0.09 10*3/mm3      Basophils, Absolute 0.02 10*3/mm3      Immature Grans, Absolute 0.01 10*3/mm3              US Testicular or Ovarian Vascular Limited  Result Date: 6/17/2025  US NON-OB TRANSVAGINAL, US TESTICULAR OR OVARIAN VASCULAR LIMITED Date of Exam: 6/17/2025 8:24 PM EDT Indication: pelvic pain. Comparison: Same day noncontrast CT of the abdomen and pelvis. Technique: Transvaginal pelvic ultrasound was performed.  Grayscale and color Doppler imaging was utilized to evaluate the pelvic area with image documentation per protocol.  Doppler spectral analysis was performed. Findings: The uterus measures 7.1 x 3.6 x 4.7 cm. The uterus is within normal limits in size and echotexture. The endometrial stripe measures 0.6 cm in thickness. Trace free fluid is visualized in the endometrium. The cervix appears unremarkable. The right ovary measures 6 x 3.3 x 3.8 cm. Slightly complex right ovarian cyst measures 3.3 x 3.4 x 3.7 cm. There is a heterogeneous right ovarian lesion with eccentric echogenicity measuring 3.1 x 2.6 x 2.3 cm, compatible with previously described dermoid. The left ovary measures 2.2 x  1.4 x 1.9 cm. Left ovary is within normal limits. Color Doppler flow is identified within both ovaries. No significant free fluid is identified within the pelvis.     Impression: Impression: Findings compatible with right ovarian hemorrhagic cyst measuring 3.3 x 3.4 x 3.7 cm. Right ovarian dermoid measures 3.1 x 2.6 x 2.3 cm. Electronically Signed: Izaiah Gustafson MD  6/17/2025 9:31 PM EDT  Workstation ID: [id number]    US Non-ob Transvaginal  Result Date: 6/17/2025  US NON-OB TRANSVAGINAL, US TESTICULAR OR OVARIAN VASCULAR LIMITED Date of Exam: 6/17/2025 8:24 PM EDT Indication: pelvic pain. Comparison: Same day noncontrast CT of the abdomen and pelvis. Technique: Transvaginal pelvic ultrasound was performed.  Grayscale and color Doppler imaging was utilized to evaluate the pelvic area with image documentation per protocol.  Doppler spectral analysis was performed. Findings: The uterus measures 7.1 x 3.6 x 4.7 cm. The uterus is within normal limits in size and echotexture. The endometrial stripe measures 0.6 cm in thickness. Trace free fluid is visualized in the endometrium. The cervix appears unremarkable. The right ovary measures 6 x 3.3 x 3.8 cm. Slightly complex right ovarian cyst measures 3.3 x 3.4 x 3.7 cm. There is a heterogeneous right ovarian lesion with eccentric echogenicity measuring 3.1 x 2.6 x 2.3 cm, compatible with previously described dermoid. The left ovary measures 2.2 x 1.4 x 1.9 cm. Left ovary is within normal limits. Color Doppler flow is identified within both ovaries. No significant free fluid is identified within the pelvis.     Impression: Impression: Findings compatible with right ovarian hemorrhagic cyst measuring 3.3 x 3.4 x 3.7 cm. Right ovarian dermoid measures 3.1 x 2.6 x 2.3 cm. Electronically Signed: Izaiah Gustafson MD  6/17/2025 9:31 PM EDT  Workstation ID: [id number]    CT Abdomen Pelvis Without Contrast  Result Date: 6/17/2025  CT ABDOMEN PELVIS WO CONTRAST Date of Exam: 6/17/2025 5:34  PM EDT Indication: diffuse pelvic pain. Comparison: CT abdomen pelvis 1/15/2024. MR abdomen 1/16/2024. Abdominal radiograph 1/18/2024. Technique: Axial CT images were obtained of the abdomen and pelvis without the administration of contrast. Reconstructed coronal and sagittal images were also obtained. Automated exposure control and iterative construction methods were used. Findings: Included Chest: Bibasal atelectasis Liver: No significant abnormality.  Gallbladder and biliary tree: Cholecystectomy  Spleen: No significant abnormality.  Pancreas: No significant abnormality.  Adrenal glands: No significant abnormality.  Kidneys and ureters: No significant abnormality.  Stomach and duodenum:No significant abnormality. Small and large bowel: No significant abnormality. No evidence of bowel obstruction. Normal-appearing appendix. Peritoneal cavity: No free fluid or free air. Bladder: Under distended and incompletely evaluated.  Pelvic organs: Similar appearance to a right ovarian dermoid cyst. Interval development of a right ovarian cyst measuring 3.6 cm, potentially physiologic. If there is clinical suspicion for right ovarian torsion, recommend pelvic ultrasound.  Vasculature: No significant abnormality  Lymph nodes: No pathologic appearing lymph nodes by imaging criteria.  Bones and soft tissues: Degenerative changes of the imaged spine. No acute osseous abnormality.     Impression: Impression: Interval development of a right ovarian cyst measuring 3.6 cm, potentially physiologic. If there is clinical suspicion for right ovarian torsion, recommend pelvic ultrasound. Similar appearance to a right ovarian dermoid cyst. Otherwise, no acute findings in the abdomen/pelvis. Electronically Signed: Sergey Leung MD  6/17/2025 6:16 PM EDT  Workstation ID: [id number]         MDM  Number of Diagnoses or Management Options  Diagnosis management comments: Patient we discharged with pain control return precautions for acute  worsening of pain suggesting possible ruptured hemorrhagic cyst versus torsion.  There is no evidence of torsion here today with normal blood flow on ultrasound.  Close follow-up with OB/GYN discharged in good condition        DDX: includes but is not limited to: endometriosis, uti, ovarian cyst     Pertinent features from physical exam: abd soft and nontender.    Initial diagnostic plan: labs, ct ap    Results from initial plan were reviewed and interpreted by me revealing labs nonactionable.     Diagnostic information from other sources: chart review    Interventions / Re-evaluation: toradol    Medications   Sodium Chloride (PF) 0.9 % 10 mL (has no administration in time range)   ketorolac (TORADOL) injection 15 mg (15 mg Intravenous Given 6/17/25 1636)       Results/clinical rationale were discussed with patient     Consultations/Discussion of results with other physicians:     Data interpreted: Nursing notes reviewed, vital signs reviewed.  Labs independently interpreted by me .  Imaging independently interpreted by me.  EKG independently interpreted by me.      Counseling: Discussed the results above with the patient regarding need for admission or discharge.  Patient understands and agrees plan of care.      -----  ED Disposition       ED Disposition   Discharge    Condition   Stable    Comment                  Final diagnoses:   Hemorrhagic ovarian cyst     Your Follow-Up Providers    Follow-up information has not been specified.       Contact information for after-discharge care    Follow-up information has not been specified.          Your medication list        START taking these medications        Instructions Last Dose Given Next Dose Due   naloxone 4 MG/0.1ML nasal spray  Commonly known as: NARCAN      Call 911. Don't prime. [place] in 1 nostril for overdose. Repeat in 2-3 minutes in other nostril if no or minimal breathing/responsiveness.       oxyCODONE 5 MG immediate release tablet  Commonly known as:  Roxicodone      Take 1 tablet by mouth Every 6 (Six) Hours As Needed for Moderate Pain for up to 4 days.              CONTINUE taking these medications        Instructions Last Dose Given Next Dose Due   docusate sodium 100 MG capsule      Take 1 capsule by mouth 2 (Two) Times a Day As Needed for Constipation.       ibuprofen 600 MG tablet  Commonly known as: ADVIL,MOTRIN      Take 1 tablet by mouth Every 6 (Six) Hours.       ondansetron 4 MG tablet  Commonly known as: Zofran      Take 1 tablet by mouth Every 8 (Eight) Hours As Needed for Nausea or Vomiting.       oxyCODONE-acetaminophen 5-325 MG per tablet  Commonly known as: PERCOCET      Take 1 tablet by mouth Every 6 (Six) Hours As Needed for Moderate Pain.       PHARMACY MEDS TO BED CONSULT      Use Daily.       Prenatal 27-1 27-1 MG tablet tablet      Take  by mouth Daily.                 Where to Get Your Medications        These medications were sent to [facility] - [place], [place] - [address] [place] [address] - [phone]  - [phone] [address] [address]      Phone: [phone]   naloxone 4 MG/0.1ML nasal spray  oxyCODONE 5 MG immediate release tablet

## 2025-06-18 NOTE — DISCHARGE INSTRUCTIONS
Recommend ibuprofen for pain oxycodone for breakthrough pain.  Close follow-up with GYN.  Return to the ER for any new or worsening symptoms.

## (undated) DEVICE — CONTN GRAD MEAS TRIANG 32OZ BLK

## (undated) DEVICE — INTRO ACCSR BLNT TP

## (undated) DEVICE — ADHS LIQ MASTISOL 2/3ML

## (undated) DEVICE — BLANKT WARM UPPR/BDY ARM/OUT 57X196CM

## (undated) DEVICE — SPHINCTEROTOME: Brand: DREAMTOME™ RX 44

## (undated) DEVICE — SUT VIC 0 UR6 27IN VCP603H

## (undated) DEVICE — SOL IRR H2O BTL 1000ML STRL

## (undated) DEVICE — GLV SURG BIOGEL LTX PF 7

## (undated) DEVICE — HYBRID CO2 TUBING/CAP SET FOR OLYMPUS® SCOPES & CO2 SOURCE: Brand: ERBE

## (undated) DEVICE — 4-PORT MANIFOLD: Brand: NEPTUNE 2

## (undated) DEVICE — SUT PDS 0 CT1 36IN Z346H

## (undated) DEVICE — LAPAROSCOPIC SMOKE FILTRATION SYSTEM: Brand: PALL LAPAROSHIELD® PLUS LAPAROSCOPIC SMOKE FILTRATION SYSTEM

## (undated) DEVICE — PATIENT RETURN ELECTRODE, SINGLE-USE, CONTACT QUALITY MONITORING, ADULT, WITH 9FT CORD, FOR PATIENTS WEIGING OVER 33LBS. (15KG): Brand: MEGADYNE

## (undated) DEVICE — SYR LL TP 10ML STRL

## (undated) DEVICE — ADAPT CLN LUM OLYMP AIR/H20

## (undated) DEVICE — APPL CHLORAPREP TINTED 26ML TEAL

## (undated) DEVICE — MAT PREVALON MOBL TRANSFR AIR W/PAD REPROC 39X81IN

## (undated) DEVICE — SUT MONOCRYL SZ 4 0 18IN PS1 Y682H BX/36

## (undated) DEVICE — ENDOPATH XCEL BLADELESS TROCARS WITH STABILITY SLEEVES: Brand: ENDOPATH XCEL

## (undated) DEVICE — SUT PLAIN  3/0 CT1 27IN 842H

## (undated) DEVICE — RETRIEVAL BALLOON CATHETER: Brand: EXTRACTOR™ PRO RX

## (undated) DEVICE — GLV SURG BIOGEL LTX PF 6

## (undated) DEVICE — KT ORCA ORCAPOD DISP STRL

## (undated) DEVICE — SUT VIC 2/0 CT1 27IN J339H BX/36

## (undated) DEVICE — SNAP KOVER: Brand: UNBRANDED

## (undated) DEVICE — SOL IRR NACL 0.9PCT BT 1000ML

## (undated) DEVICE — ENDOPOUCH RETRIEVER SPECIMEN RETRIEVAL BAGS: Brand: ENDOPOUCH RETRIEVER

## (undated) DEVICE — ST LINER SAFECAP GRN RED CP STRL

## (undated) DEVICE — LUBE JELLY FOIL PACK 1.4 OZ: Brand: MEDLINE INDUSTRIES, INC.

## (undated) DEVICE — SYR LUERLOK 50ML

## (undated) DEVICE — PDS II VLT 0 107CM AG ST3: Brand: ENDOLOOP

## (undated) DEVICE — DEV LK WIREGUIDE FUSN OLYMP SCP

## (undated) DEVICE — TUBING, SUCTION, 1/4" X 10', STRAIGHT: Brand: MEDLINE

## (undated) DEVICE — FIRST STEP BEDSIDE ADD WATER KIT - RESEALABLE STAND-UP POUCH, ENDOSCOPIC CLEANING PAD - 1 POUCH: Brand: FIRST STEP BEDSIDE ADD WATER KIT - RESEALABLE STAND-UP POUCH, ENDOSCOPIC CLEANIN

## (undated) DEVICE — PK LAP LASR CHOLE 10

## (undated) DEVICE — [HIGH FLOW INSUFFLATOR,  DO NOT USE IF PACKAGE IS DAMAGED,  KEEP DRY,  KEEP AWAY FROM SUNLIGHT,  PROTECT FROM HEAT AND RADIOACTIVE SOURCES.]: Brand: PNEUMOSURE

## (undated) DEVICE — ST CATH CHOLANG WKARLAN/BALN 2LUM 4F 1.25

## (undated) DEVICE — UNDERGLV SURG BIOGEL INDICATOR LF PF 7.5

## (undated) DEVICE — THE BITE BLOCK MAXI, LATEX FREE STRAP IS USED TO PROTECT THE ENDOSCOPE INSERTION TUBE FROM BEING BITTEN BY THE PATIENT.

## (undated) DEVICE — TRAP FLD MINIVAC MEGADYNE 100ML

## (undated) DEVICE — PK C/SECT 10

## (undated) DEVICE — ENDOPATH XCEL UNIVERSAL TROCAR STABLILITY SLEEVES: Brand: ENDOPATH XCEL

## (undated) DEVICE — THE DISPOSABLE RAPTOR GRASPING DEVICE IS USED TO GRASP TISSUE AND/OR RETRIEVE FOREIGN BODIES, EXCISED TISSUE AND STENTS DURING ENDOSCOPIC PROCEDURES.: Brand: RAPTOR

## (undated) DEVICE — CLTH CLENS READYCLEANSE PERI CARE PK/5

## (undated) DEVICE — SUT MNCRYL 1/0 CT1 36IN Y947H BX/36

## (undated) DEVICE — BOWL PLSTC MD 16OZ BLU STRL

## (undated) DEVICE — SPNG ENDO BEDSIDE TUB ENZYM

## (undated) DEVICE — ADHS SKIN PREMIERPRO EXOFIN TOPICAL HI/VISC .5ML

## (undated) DEVICE — MINI ENDOCUT SCISSOR TIP, DISPOSABLE: Brand: RENEW

## (undated) DEVICE — SOLIDIFIER LIQ PREMISORB 1500CC

## (undated) DEVICE — SUT MNCRYL PLS ANTIB UD 4/0 PS2 18IN

## (undated) DEVICE — ERBE NESSY®PLATE 170 SPLIT; 168CM²; CABLE 3M: Brand: ERBE

## (undated) DEVICE — TBG PENCL TELESCP MEGADYNE SMOKE EVAC 10FT

## (undated) DEVICE — SAFELINER SUCTION CANISTER 1000CC: Brand: DEROYAL

## (undated) DEVICE — LAPAROVUE VISIBILITY SYSTEM LAPAROSCOPIC SOLUTIONS: Brand: LAPAROVUE